# Patient Record
Sex: FEMALE | Race: WHITE | NOT HISPANIC OR LATINO | Employment: FULL TIME | URBAN - METROPOLITAN AREA
[De-identification: names, ages, dates, MRNs, and addresses within clinical notes are randomized per-mention and may not be internally consistent; named-entity substitution may affect disease eponyms.]

---

## 2023-07-24 LAB
C REACTIVE PROTEIN (MG/L) IN SER/PLAS: <0.1 MG/DL
RHEUMATOID FACTOR (IU/ML) IN SERUM OR PLASMA: <10 IU/ML (ref 0–15)
SEDIMENTATION RATE, ERYTHROCYTE: <1 MM/H (ref 0–20)

## 2023-07-25 LAB — ANTI-NUCLEAR ANTIBODY (ANA): NEGATIVE

## 2023-10-16 DIAGNOSIS — Z00.00 HEALTHCARE MAINTENANCE: Primary | ICD-10-CM

## 2023-10-16 RX ORDER — NORGESTIMATE AND ETHINYL ESTRADIOL 7DAYSX3 LO
1 KIT ORAL DAILY
Qty: 84 TABLET | Refills: 3 | Status: SHIPPED | OUTPATIENT
Start: 2023-10-16 | End: 2024-04-24 | Stop reason: WASHOUT

## 2024-01-05 ENCOUNTER — LAB (OUTPATIENT)
Dept: LAB | Facility: LAB | Age: 29
End: 2024-01-05
Payer: COMMERCIAL

## 2024-01-05 ENCOUNTER — OFFICE VISIT (OUTPATIENT)
Dept: PRIMARY CARE | Facility: CLINIC | Age: 29
End: 2024-01-05
Payer: COMMERCIAL

## 2024-01-05 VITALS
DIASTOLIC BLOOD PRESSURE: 77 MMHG | WEIGHT: 116 LBS | SYSTOLIC BLOOD PRESSURE: 127 MMHG | BODY MASS INDEX: 20.55 KG/M2 | OXYGEN SATURATION: 98 % | HEART RATE: 82 BPM | HEIGHT: 63 IN

## 2024-01-05 DIAGNOSIS — E55.9 VITAMIN D DEFICIENCY: ICD-10-CM

## 2024-01-05 DIAGNOSIS — Z00.00 ANNUAL PHYSICAL EXAM: ICD-10-CM

## 2024-01-05 DIAGNOSIS — Z00.00 ANNUAL PHYSICAL EXAM: Primary | ICD-10-CM

## 2024-01-05 LAB
25(OH)D3 SERPL-MCNC: 69 NG/ML (ref 30–100)
ALBUMIN SERPL BCP-MCNC: 4.6 G/DL (ref 3.4–5)
ALP SERPL-CCNC: 45 U/L (ref 33–110)
ALT SERPL W P-5'-P-CCNC: 25 U/L (ref 7–45)
ANION GAP SERPL CALC-SCNC: 13 MMOL/L (ref 10–20)
AST SERPL W P-5'-P-CCNC: 21 U/L (ref 9–39)
BILIRUB SERPL-MCNC: 0.7 MG/DL (ref 0–1.2)
BUN SERPL-MCNC: 13 MG/DL (ref 6–23)
CALCIUM SERPL-MCNC: 9.5 MG/DL (ref 8.6–10.6)
CHLORIDE SERPL-SCNC: 104 MMOL/L (ref 98–107)
CHOLEST SERPL-MCNC: 204 MG/DL (ref 0–199)
CHOLESTEROL/HDL RATIO: 2.8
CO2 SERPL-SCNC: 27 MMOL/L (ref 21–32)
CREAT SERPL-MCNC: 0.66 MG/DL (ref 0.5–1.05)
ERYTHROCYTE [DISTWIDTH] IN BLOOD BY AUTOMATED COUNT: 12 % (ref 11.5–14.5)
EST. AVERAGE GLUCOSE BLD GHB EST-MCNC: 91 MG/DL
GFR SERPL CREATININE-BSD FRML MDRD: >90 ML/MIN/1.73M*2
GLUCOSE SERPL-MCNC: 101 MG/DL (ref 74–99)
HBA1C MFR BLD: 4.8 %
HCT VFR BLD AUTO: 38.8 % (ref 36–46)
HDLC SERPL-MCNC: 73.2 MG/DL
HGB BLD-MCNC: 13.3 G/DL (ref 12–16)
LDLC SERPL CALC-MCNC: 121 MG/DL
MCH RBC QN AUTO: 31.1 PG (ref 26–34)
MCHC RBC AUTO-ENTMCNC: 34.3 G/DL (ref 32–36)
MCV RBC AUTO: 91 FL (ref 80–100)
NON HDL CHOLESTEROL: 131 MG/DL (ref 0–149)
NRBC BLD-RTO: 0 /100 WBCS (ref 0–0)
PLATELET # BLD AUTO: 239 X10*3/UL (ref 150–450)
POTASSIUM SERPL-SCNC: 4 MMOL/L (ref 3.5–5.3)
PROT SERPL-MCNC: 6.8 G/DL (ref 6.4–8.2)
RBC # BLD AUTO: 4.27 X10*6/UL (ref 4–5.2)
SODIUM SERPL-SCNC: 140 MMOL/L (ref 136–145)
TRIGL SERPL-MCNC: 47 MG/DL (ref 0–149)
TSH SERPL-ACNC: 0.78 MIU/L (ref 0.44–3.98)
VIT B12 SERPL-MCNC: 339 PG/ML (ref 211–911)
VLDL: 9 MG/DL (ref 0–40)
WBC # BLD AUTO: 5.7 X10*3/UL (ref 4.4–11.3)

## 2024-01-05 PROCEDURE — 80061 LIPID PANEL: CPT

## 2024-01-05 PROCEDURE — 80053 COMPREHEN METABOLIC PANEL: CPT

## 2024-01-05 PROCEDURE — 99395 PREV VISIT EST AGE 18-39: CPT | Performed by: FAMILY MEDICINE

## 2024-01-05 PROCEDURE — 36415 COLL VENOUS BLD VENIPUNCTURE: CPT

## 2024-01-05 PROCEDURE — 83036 HEMOGLOBIN GLYCOSYLATED A1C: CPT

## 2024-01-05 PROCEDURE — 1036F TOBACCO NON-USER: CPT | Performed by: FAMILY MEDICINE

## 2024-01-05 PROCEDURE — 82607 VITAMIN B-12: CPT

## 2024-01-05 PROCEDURE — 85027 COMPLETE CBC AUTOMATED: CPT

## 2024-01-05 PROCEDURE — 84443 ASSAY THYROID STIM HORMONE: CPT

## 2024-01-05 PROCEDURE — 82306 VITAMIN D 25 HYDROXY: CPT

## 2024-01-05 RX ORDER — LACTOBACILLUS COMBINATION NO.4 3B CELL
CAPSULE ORAL
COMMUNITY
End: 2024-04-24 | Stop reason: WASHOUT

## 2024-01-05 ASSESSMENT — PATIENT HEALTH QUESTIONNAIRE - PHQ9
SUM OF ALL RESPONSES TO PHQ9 QUESTIONS 1 AND 2: 0
1. LITTLE INTEREST OR PLEASURE IN DOING THINGS: NOT AT ALL
2. FEELING DOWN, DEPRESSED OR HOPELESS: NOT AT ALL

## 2024-01-05 ASSESSMENT — COLUMBIA-SUICIDE SEVERITY RATING SCALE - C-SSRS
2. HAVE YOU ACTUALLY HAD ANY THOUGHTS OF KILLING YOURSELF?: NO
1. IN THE PAST MONTH, HAVE YOU WISHED YOU WERE DEAD OR WISHED YOU COULD GO TO SLEEP AND NOT WAKE UP?: NO

## 2024-01-05 ASSESSMENT — ENCOUNTER SYMPTOMS
OCCASIONAL FEELINGS OF UNSTEADINESS: 0
DEPRESSION: 0

## 2024-01-05 NOTE — PROGRESS NOTES
"Subjective   Patient ID: Faustino Golden is a 28 y.o. female who presents for Annual Exam. I have reviewed her past social, surgical, family and medical history.    Last Annual Physical: Jan 2023   Last Dental Visit: Up-to-date   Last Eye exam: Jan 2024  Hearing Concerns: No  Diet: Well- balanced  Exercise Routine: Regular exercise      HPI   The patient reports that she is taking oral contraceptive pills as form of birth control. She has a family history of hypothyroidism and hypertension. She is interested in trying to conceive.    Review of Systems  Constitutional: No fever or chills, No Night Sweats  Eyes: No Blurry Vision or Eye sight problems  ENT: No Nasal Discharge, Hoarseness, sore throat  Cardiovascular: no chest pain, no palpitations and no syncope.   Respiratory: no cough, no shortness of breath during exertion and no shortness of breath at rest.   Gastrointestinal: no abdominal pain, no nausea and no vomiting.   : No vaginal discharge, burning with urination, no blood in urine or stools  Skin: No Skin rashes or Lesions  Neuro: No Headache, no dizziness or Numbness or tingling  Psych: No Anxiety, depression or sleeping problems  Heme: No Easy bleeding or brusing.     Objective   /77   Pulse 82   Ht 1.6 m (5' 3\")   Wt 52.6 kg (116 lb)   SpO2 98%   BMI 20.55 kg/m²     Physical Exam  Patient declined Chaperone  Constitutional: Alert and in no acute distress. Well developed, well nourished.   Head and Face: Head and face: Normal.    Eyes: Normal external exam. Pupils were equal in size, round, reactive to light (PERRL) with normal accommodation and extraocular movements intact (EOMI).   Ears, Nose, Mouth, and Throat: External inspection of ears and nose: Normal.  Hearing: Normal.  Nasal mucosa, septum, and turbinates: Normal.  Lips, teeth, and gums: Normal.  Oropharynx: Normal.   Neck: No neck mass was observed. Supple. Thyroid not enlarged and there were no palpable thyroid nodules. "   Cardiovascular: Heart rate and rhythm were normal, normal S1 and S2. Pedal pulses: Normal. No peripheral edema.   Pulmonary: No respiratory distress. Clear bilateral breath sounds.   Breast: Normal Appearance, No Masses or lumps palpated  Abdomen: Soft nontender; no abdominal mass palpated. Normal bowel sounds. No organomegaly.   Musculoskeletal: No joint swelling seen, normal movements of all extremities. Range of motion: Normal.  Muscle strength/tone: Normal.    Skin: Normal skin color and pigmentation, normal skin turgor, and no rash.   Neurologic: Deep tendon reflexes were 2+ and symmetric.   Psychiatric: Judgment and insight: Intact. Mood and affect: Normal.  Lymphatic: + bilateral cervical lymphadenopathy. Palpation of lymph nodes in axillae: Normal.  Palpation of lymph nodes in groin: Normal.    Lab Results   Component Value Date    WBC 4.4 01/12/2023    HGB 12.5 01/12/2023    HCT 37.9 01/12/2023     01/12/2023    CHOL 150 01/12/2023    TRIG 79 01/12/2023    HDL 65.1 01/12/2023    ALT 27 01/12/2023    AST 22 01/12/2023     01/12/2023    K 4.3 01/12/2023     01/12/2023    CREATININE 0.67 01/12/2023    BUN 12 01/12/2023    CO2 27 01/12/2023    TSH 1.72 01/12/2023    HGBA1C 4.9 01/12/2023       Electrocardiogram 12 Lead  Normal sinus rhythm  Normal ECG  No previous ECGs available  Confirmed by PAOLO GO MD ([id number]) on 7/29/2019 4:47:37 AM      Assessment/Plan   Diagnoses and all orders for this visit:  Annual physical exam  -     CBC; Future  -     Comprehensive Metabolic Panel; Future  -     Hemoglobin A1C; Future  -     Lipid Panel; Future  -     TSH with reflex to Free T4 if abnormal; Future  Vitamin D deficiency  -     Vitamin B12; Future  -     Vitamin D 25-Hydroxy,Total (for eval of Vitamin D levels); Future        Dear Faustino Golden     It was my pleasure to take care of you today in the office. Below are the things we discussed today:    1. 1. Immunizations: Yearly Flu shot is  recommended. Up-to-date          a: COVID: Please get booster from the pharmacy         b: Tetanus: Up-to-date         C. HPV: Up-to-date    2. Blood Work: Ordered  3. Seen your dentist twice a year  4. Yearly Eye exam is recommended    5. BMI: Normal   6: Diet recommendations:   Eat Clean, Try to have as many home cooked meals as possible  Avoid processed foods which contain excess calories, sugar, and sodium.    7. Exercise recommendations:   150 minutes a week to maintain your weight     If you have to loose weight, you need a better diet and exercise plan.     8. PAP - Up-to-date. Last done in Dec 2021. Cytology and HPV negative. The patient is scheduled to follow up with GYN in June 2024.    9. Please get your Living will / Advance directive completed if you do not have one already. Please make sure our office has a copy of the latest one.     10. Birth control: The patient is on OCPs.    11. Preconception counseling: Encouraged her to take a prenatal.    12. Cervical lymph node: Advised her to continue to monitor and if it increases in size  please let me know.    Follow up in one year for a Physical. Please call the office before your Physical to see if you need blood work completed prior to your physical.     Please call me if any questions arise from now until your next visit. I will call you after I am done seeing patients. A Doctor is always available by phone when the office is closed. Please feel free to call for help with any problem that you feel shouldn't wait until the office re-opens.     Scribe Attestation  By signing my name below, IGiselle Scribe   attest that this documentation has been prepared under the direction and in the presence of Shayy Samano MD.

## 2024-04-24 ENCOUNTER — INITIAL PRENATAL (OUTPATIENT)
Dept: OBSTETRICS AND GYNECOLOGY | Facility: CLINIC | Age: 29
End: 2024-04-24
Payer: COMMERCIAL

## 2024-04-24 VITALS — WEIGHT: 110.6 LBS | SYSTOLIC BLOOD PRESSURE: 118 MMHG | DIASTOLIC BLOOD PRESSURE: 70 MMHG | BODY MASS INDEX: 19.59 KG/M2

## 2024-04-24 DIAGNOSIS — Z34.91 PRENATAL CARE IN FIRST TRIMESTER (HHS-HCC): ICD-10-CM

## 2024-04-24 PROBLEM — Z00.00 ANNUAL PHYSICAL EXAM: Status: RESOLVED | Noted: 2024-01-05 | Resolved: 2024-04-24

## 2024-04-24 PROBLEM — Z34.01 ENCOUNTER FOR SUPERVISION OF NORMAL FIRST PREGNANCY IN FIRST TRIMESTER (HHS-HCC): Status: ACTIVE | Noted: 2024-04-24

## 2024-04-24 LAB — PREGNANCY TEST URINE, POC: POSITIVE

## 2024-04-24 PROCEDURE — 87800 DETECT AGNT MULT DNA DIREC: CPT

## 2024-04-24 PROCEDURE — 81025 URINE PREGNANCY TEST: CPT

## 2024-04-24 PROCEDURE — 0500F INITIAL PRENATAL CARE VISIT: CPT

## 2024-04-24 PROCEDURE — 87086 URINE CULTURE/COLONY COUNT: CPT

## 2024-04-24 ASSESSMENT — ENCOUNTER SYMPTOMS
ENDOCRINE NEGATIVE: 0
ALLERGIC/IMMUNOLOGIC NEGATIVE: 0
HEMATOLOGIC/LYMPHATIC NEGATIVE: 0
CARDIOVASCULAR NEGATIVE: 0
CONSTITUTIONAL NEGATIVE: 0
NEUROLOGICAL NEGATIVE: 0
PSYCHIATRIC NEGATIVE: 0
RESPIRATORY NEGATIVE: 0
EYES NEGATIVE: 0
MUSCULOSKELETAL NEGATIVE: 0
GASTROINTESTINAL NEGATIVE: 0

## 2024-04-24 ASSESSMENT — EDINBURGH POSTNATAL DEPRESSION SCALE (EPDS)
I HAVE FELT SAD OR MISERABLE: NO, NOT AT ALL
I HAVE LOOKED FORWARD WITH ENJOYMENT TO THINGS: AS MUCH AS I EVER DID
THINGS HAVE BEEN GETTING ON TOP OF ME: NO, MOST OF THE TIME I HAVE COPED QUITE WELL
I HAVE BEEN SO UNHAPPY THAT I HAVE BEEN CRYING: NO, NEVER
TOTAL SCORE: 4
I HAVE BEEN ABLE TO LAUGH AND SEE THE FUNNY SIDE OF THINGS: AS MUCH AS I ALWAYS COULD
I HAVE BEEN ANXIOUS OR WORRIED FOR NO GOOD REASON: HARDLY EVER
I HAVE BEEN SO UNHAPPY THAT I HAVE HAD DIFFICULTY SLEEPING: NOT AT ALL
THE THOUGHT OF HARMING MYSELF HAS OCCURRED TO ME: NEVER
I HAVE FELT SCARED OR PANICKY FOR NO GOOD REASON: NO, NOT MUCH
I HAVE BLAMED MYSELF UNNECESSARILY WHEN THINGS WENT WRONG: NOT VERY OFTEN

## 2024-04-24 NOTE — PROGRESS NOTES
Faustino Golden is a 28 y.o.  at 7w5d with a working estimated date of delivery of 2024, by Last Menstrual Period who presents for an initial prenatal visit. This pregnancy is unplanned.    Stopped birth control pills two months ago, though was not trying to conceive, wanted to regulate her periods.     Patient's last menstrual period was 2024.  US ordered; patient may defer until 11-13 weeks.      Patient currently experiencing:  Breast tenderness.    Bleeding or cramping since LMP: no  Taking prenatal vitamin: Yes  Ultrasound completed this pregnancy: No      OB History    Para Term  AB Living   1 0 0 0 0 0   SAB IAB Ectopic Multiple Live Births   0 0 0 0 0      # Outcome Date GA Lbr Rai/2nd Weight Sex Delivery Anes PTL Lv   1 Current              [place]  Depression Scale Total: 4    History of hypertension:  No  Preeclampsia Risk - ASA prophylaxis; pt does not meet criteria.    Past Medical History:   Diagnosis Date    Acute maxillary sinusitis, unspecified 2019    Acute non-recurrent maxillary sinusitis    Acute vaginitis 2019    Bacterial vaginosis    Headache, unspecified 2020    New onset of headaches    Migraine with aura, not intractable, without status migrainosus 2020    Ophthalmic migraine    Migraine, unspecified, not intractable, without status migrainosus 2020    Migraine headache    Personal history of other diseases of the female genital tract     History of abnormal menstrual cycle    Viral infection, unspecified 2020    Acute viral syndrome      Last pap: 2021, normal.   Hx Depression/Anxiety - No    Past Surgical History:   Procedure Laterality Date    OTHER SURGICAL HISTORY  2020    Oral Surgery        Social History     Tobacco Use    Smoking status: Never    Smokeless tobacco: Never   Vaping Use    Vaping status: Never Used   Substance Use Topics    Alcohol use: Not Currently    Drug use: Never       Routine PNC, patient appropriate for midwifery service. Oriented to practice, including available collaboration and consulting with physicians.   Discussed routine OB labs, including serum STI/HIV, CBC, blood type and screen.    Pregravid BMI: 19.49  Expected Total Weight Gain: 11.5 kg (25 lb)-16 kg (35 lb)     Education provided r/t nutrition, folic acid supplementation, dietary guidelines, exercise, smoking, alcohol, caffeine, and drug use.    Covid vaccinated x 2.  Strong recommendation and support for Covid vaccine discussed. Patient aware of increased rate of hospitalization, intubation, and death with Covid in pregnancy - Demonstrated safety of vaccination during pregnancy with no associated increases in GDM, hypertensive disorders, miscarriage/ labor nor fetal anomalies reviewed. Patient aware vaccination is recommended by ACNM, ACOG, SMFM, and CDC - She declines current vaccination.    Flu vaccinated.     Current Outpatient Medications:     conception assist.supplies no1 (CONCEPTION ASSIST SUPP CMB  1 MISC), , Disp: , Rfl:     PRENATAL 2-IRON-FOLIC ACID-OM3 ORAL, Take by mouth., Disp: , Rfl:      Genetic Testing - The patient was counselled regarding prenatal genetic testing options and was provided literature in the obstetric folder. First line screening options, including cfDNA and first trimester ultrasound for nuchal translucency, were discussed and offered.  Benefits, drawbacks, and limitations were explained respectively.  It was clearly stated that only diagnostic testing (amniocentesis) provides definitive information regarding a genetic diagnosis in the fetus. It was discussed with the patient that the false positive rates for NIPT is higher for women under 35 years of age. The patient was informed that the cost of NIPT ranges and may not be covered by insurance depending on patient's age and risk factors. Patient aware that gender testing is available at a fraction of the cost through  [employer].  This patient has decided to pursue.     Lives in [place], [place], considering transferring care to [facility] if they move.     Warning s/s discussed and SAB precautions reviewed.  F/U in 4 weeks -- Review U/S, needs new OB labs/NIPT, PE and pap.    Hazel Szymanski, APRN-CNM

## 2024-04-25 LAB
C TRACH RRNA SPEC QL NAA+PROBE: NEGATIVE
N GONORRHOEA DNA SPEC QL PROBE+SIG AMP: NEGATIVE

## 2024-04-26 LAB — BACTERIA UR CULT: NORMAL

## 2024-05-22 ENCOUNTER — LAB (OUTPATIENT)
Dept: LAB | Facility: LAB | Age: 29
End: 2024-05-22
Payer: COMMERCIAL

## 2024-05-22 ENCOUNTER — ROUTINE PRENATAL (OUTPATIENT)
Dept: OBSTETRICS AND GYNECOLOGY | Facility: CLINIC | Age: 29
End: 2024-05-22
Payer: COMMERCIAL

## 2024-05-22 VITALS
WEIGHT: 113.25 LBS | SYSTOLIC BLOOD PRESSURE: 128 MMHG | BODY MASS INDEX: 20.06 KG/M2 | DIASTOLIC BLOOD PRESSURE: 70 MMHG

## 2024-05-22 DIAGNOSIS — Z3A.11 11 WEEKS GESTATION OF PREGNANCY (HHS-HCC): Primary | ICD-10-CM

## 2024-05-22 DIAGNOSIS — Z34.91 PRENATAL CARE IN FIRST TRIMESTER (HHS-HCC): ICD-10-CM

## 2024-05-22 LAB
ABO GROUP (TYPE) IN BLOOD: NORMAL
ANTIBODY SCREEN: NORMAL
ERYTHROCYTE [DISTWIDTH] IN BLOOD BY AUTOMATED COUNT: 12.2 % (ref 11.5–14.5)
HBV SURFACE AG SERPL QL IA: NONREACTIVE
HCT VFR BLD AUTO: 34 % (ref 36–46)
HCV AB SER QL: NONREACTIVE
HGB BLD-MCNC: 11.7 G/DL (ref 12–16)
HIV 1+2 AB+HIV1 P24 AG SERPL QL IA: NONREACTIVE
MCH RBC QN AUTO: 31.6 PG (ref 26–34)
MCHC RBC AUTO-ENTMCNC: 34.4 G/DL (ref 32–36)
MCV RBC AUTO: 92 FL (ref 80–100)
NRBC BLD-RTO: 0 /100 WBCS (ref 0–0)
PLATELET # BLD AUTO: 241 X10*3/UL (ref 150–450)
RBC # BLD AUTO: 3.7 X10*6/UL (ref 4–5.2)
REFLEX ADDED, ANEMIA PANEL: NORMAL
RH FACTOR (ANTIGEN D): NORMAL
WBC # BLD AUTO: 7.5 X10*3/UL (ref 4.4–11.3)

## 2024-05-22 PROCEDURE — 36415 COLL VENOUS BLD VENIPUNCTURE: CPT

## 2024-05-22 PROCEDURE — 86901 BLOOD TYPING SEROLOGIC RH(D): CPT

## 2024-05-22 PROCEDURE — 87389 HIV-1 AG W/HIV-1&-2 AB AG IA: CPT

## 2024-05-22 PROCEDURE — 86317 IMMUNOASSAY INFECTIOUS AGENT: CPT

## 2024-05-22 PROCEDURE — 86780 TREPONEMA PALLIDUM: CPT

## 2024-05-22 PROCEDURE — 86900 BLOOD TYPING SEROLOGIC ABO: CPT

## 2024-05-22 PROCEDURE — 0501F PRENATAL FLOW SHEET: CPT

## 2024-05-22 PROCEDURE — 85027 COMPLETE CBC AUTOMATED: CPT

## 2024-05-22 PROCEDURE — 86850 RBC ANTIBODY SCREEN: CPT

## 2024-05-22 PROCEDURE — 86803 HEPATITIS C AB TEST: CPT

## 2024-05-22 PROCEDURE — 88175 CYTOPATH C/V AUTO FLUID REDO: CPT

## 2024-05-22 PROCEDURE — 87340 HEPATITIS B SURFACE AG IA: CPT

## 2024-05-22 NOTE — PROGRESS NOTES
"Subjective   Faustino Golden \"Aishwarya\" is a 28 y.o.  at 11w5d with a working estimated date of delivery of 2024, by Last Menstrual Period who presents for a routine prenatal visit.    Patient reports overall feeling well; no concerns or OB complaints.  Nausea has improved.  Recently returned from trip to [place].    Objective   Visit Vitals  /70   Wt 51.4 kg (113 lb 4 oz)   LMP 2024   BMI 20.06 kg/m²   OB Status Pregnant   Smoking Status Never   BSA 1.51 m²     Vitals:    24 1600   Weight: 51.4 kg (113 lb 4 oz)      Pregravid Weight/BMI - 49.9 kg (110 lb) / 19.49  Expected Total Weight Gain - 11.5 kg (25 lb)-16 kg (35 lb)  TW.474 kg (3 lb 4 oz)  Fundal height and FHR per prenatal flowsheet.    Assessment/Plan   OB labs, NIPT, pap and PE today.  Will schedule for 13 week US.  When NIPT results, CALL PATIENT with results rather than MyChart message, does not want to know sex; planning a gender reveal.  Warning signs and symptoms reviewed; patient has emergency answering service phone line number and is aware of when to call.   Remainder of plan per problem list as below.     Medical Problems       Problem List       Encounter for supervision of normal first pregnancy in first trimester ([facility])    Overview Signed 2024  8:33 AM by DEEP Lund     Covid vaccinated x 2; declines current vaccination.  Flu vaccinated.              F/U in 4 weeks.    DEEP Lund    "

## 2024-05-23 PROBLEM — Z67.91 RH NEGATIVE STATE IN ANTEPARTUM PERIOD (HHS-HCC): Status: ACTIVE | Noted: 2024-05-23

## 2024-05-23 PROBLEM — O26.899 RH NEGATIVE STATE IN ANTEPARTUM PERIOD (HHS-HCC): Status: ACTIVE | Noted: 2024-05-23

## 2024-05-23 LAB
RUBV IGG SERPL IA-ACNC: 3.8 IA
RUBV IGG SERPL QL IA: POSITIVE
TREPONEMA PALLIDUM IGG+IGM AB [PRESENCE] IN SERUM OR PLASMA BY IMMUNOASSAY: NONREACTIVE

## 2024-05-29 LAB — SCAN RESULT: NORMAL

## 2024-05-30 LAB
CYTOLOGY CMNT CVX/VAG CYTO-IMP: NORMAL
LAB AP HPV GENOTYPE QUESTION: YES
LAB AP HPV HR: NORMAL
LABORATORY COMMENT REPORT: NORMAL
MENSTRUAL HX REPORTED: NORMAL
PATH REPORT.TOTAL CANCER: NORMAL

## 2024-06-03 ENCOUNTER — HOSPITAL ENCOUNTER (OUTPATIENT)
Dept: RADIOLOGY | Facility: CLINIC | Age: 29
Discharge: HOME | End: 2024-06-03
Payer: COMMERCIAL

## 2024-06-03 DIAGNOSIS — Z34.91 PRENATAL CARE IN FIRST TRIMESTER (HHS-HCC): ICD-10-CM

## 2024-06-03 PROCEDURE — 76813 OB US NUCHAL MEAS 1 GEST: CPT

## 2024-06-03 PROCEDURE — 76813 OB US NUCHAL MEAS 1 GEST: CPT | Performed by: STUDENT IN AN ORGANIZED HEALTH CARE EDUCATION/TRAINING PROGRAM

## 2024-06-20 ENCOUNTER — APPOINTMENT (OUTPATIENT)
Dept: OBSTETRICS AND GYNECOLOGY | Facility: CLINIC | Age: 29
End: 2024-06-20
Payer: COMMERCIAL

## 2024-06-26 ENCOUNTER — APPOINTMENT (OUTPATIENT)
Dept: OBSTETRICS AND GYNECOLOGY | Facility: CLINIC | Age: 29
End: 2024-06-26
Payer: COMMERCIAL

## 2024-06-26 VITALS — WEIGHT: 113 LBS | DIASTOLIC BLOOD PRESSURE: 70 MMHG | SYSTOLIC BLOOD PRESSURE: 114 MMHG | BODY MASS INDEX: 20.02 KG/M2

## 2024-06-26 DIAGNOSIS — Z3A.15 15 WEEKS GESTATION OF PREGNANCY (HHS-HCC): Primary | ICD-10-CM

## 2024-06-26 PROCEDURE — 0501F PRENATAL FLOW SHEET: CPT

## 2024-06-26 NOTE — PROGRESS NOTES
"Subjective   Faustino Golden \"Aishwarya\" is a 28 y.o.  at 16w5d with a working estimated date of delivery of 2024, by Last Menstrual Period who presents for a routine prenatal visit.    Patient reports overall feeling well; no concerns or OB complaints.    Objective   Visit Vitals  /70   Wt 51.3 kg (113 lb)   LMP 2024   BMI 20.02 kg/m²   OB Status Pregnant   Smoking Status Never   BSA 1.51 m²     Vitals:    24 1300   Weight: 51.3 kg (113 lb)      Pregravid Weight/BMI - 49.9 kg (110 lb) / 19.49  Expected Total Weight Gain - 11.5 kg (25 lb)-16 kg (35 lb)  TW.361 kg (3 lb)  Fundal height and FHR per prenatal flowsheet.    Assessment/Plan   Reviewed new OB labs and normal NIPT results.   A negative blood type -- Discussed indication for Rhogam at 28 weeks.  Reviewed 13 week US and newly assigned FLORENTIN.   Anatomy US is scheduled.   [place] [facility]; transferring remaining visits after next visit with provider at [facility].  Discussed timing of when to expect to begin to feel fetal movement.  Warning signs and symptoms reviewed; patient has emergency answering service phone line number and is aware of when to call.   Remainder of plan per problem list as below.     Medical Problems       Problem List       Encounter for supervision of normal first pregnancy in first trimester ([facility])    Overview Addendum 2024  8:13 AM by DEEP Lund     Covid vaccinated x 2; declines current vaccination.  Flu vaccinated.   Normal rr NIPS -- It's a BOY!          Rh negative state in antepartum period ([facility])    Overview Signed 2024  9:18 AM by DEEP Lund     For Rhogam at 28 weeks.             F/U in 4 weeks; review anatomy US.    DEEP Lund    "

## 2024-07-22 ENCOUNTER — HOSPITAL ENCOUNTER (OUTPATIENT)
Dept: RADIOLOGY | Facility: CLINIC | Age: 29
Discharge: HOME | End: 2024-07-22
Payer: COMMERCIAL

## 2024-07-22 DIAGNOSIS — Z34.91 PRENATAL CARE IN FIRST TRIMESTER (HHS-HCC): ICD-10-CM

## 2024-07-22 PROCEDURE — 76811 OB US DETAILED SNGL FETUS: CPT | Performed by: MEDICAL GENETICS

## 2024-07-22 PROCEDURE — 76811 OB US DETAILED SNGL FETUS: CPT

## 2024-07-24 ENCOUNTER — ROUTINE PRENATAL (OUTPATIENT)
Dept: OBSTETRICS AND GYNECOLOGY | Facility: CLINIC | Age: 29
End: 2024-07-24
Payer: COMMERCIAL

## 2024-07-24 ENCOUNTER — APPOINTMENT (OUTPATIENT)
Dept: OBSTETRICS AND GYNECOLOGY | Facility: CLINIC | Age: 29
End: 2024-07-24
Payer: COMMERCIAL

## 2024-07-24 VITALS — WEIGHT: 115 LBS | BODY MASS INDEX: 20.37 KG/M2 | SYSTOLIC BLOOD PRESSURE: 112 MMHG | DIASTOLIC BLOOD PRESSURE: 64 MMHG

## 2024-07-24 DIAGNOSIS — O26.899 RH NEGATIVE STATE IN ANTEPARTUM PERIOD (HHS-HCC): ICD-10-CM

## 2024-07-24 DIAGNOSIS — Z34.02 ENCOUNTER FOR SUPERVISION OF NORMAL FIRST PREGNANCY IN SECOND TRIMESTER (HHS-HCC): ICD-10-CM

## 2024-07-24 DIAGNOSIS — Z3A.19 19 WEEKS GESTATION OF PREGNANCY (HHS-HCC): Primary | ICD-10-CM

## 2024-07-24 DIAGNOSIS — Z67.91 RH NEGATIVE STATE IN ANTEPARTUM PERIOD (HHS-HCC): ICD-10-CM

## 2024-07-24 PROCEDURE — 0501F PRENATAL FLOW SHEET: CPT | Performed by: ADVANCED PRACTICE MIDWIFE

## 2024-07-24 NOTE — PROGRESS NOTES
"Return OB visit    S: Faustino Golden \"Aishwarya\" is a 29 y.o.  at 19w2d with a working estimated date of delivery of 2024, by Ultrasound who presents for a routine prenatal visit. She denies vaginal bleeding, abdominal pain, leakage of fluid.     Concerns today: Patient has no questions or concerns today.    YANIRA EVANS MA      O: See prenatal flow sheet    A/P:  Anatomy sono WNL  Reviewed warning signs and when to call midwife  Follow up in 4 weeks for a routine prenatal visit  "

## 2024-08-19 ENCOUNTER — ROUTINE PRENATAL (OUTPATIENT)
Dept: OBSTETRICS AND GYNECOLOGY | Facility: CLINIC | Age: 29
End: 2024-08-19
Payer: COMMERCIAL

## 2024-08-19 VITALS — WEIGHT: 121 LBS | BODY MASS INDEX: 21.43 KG/M2 | SYSTOLIC BLOOD PRESSURE: 127 MMHG | DIASTOLIC BLOOD PRESSURE: 83 MMHG

## 2024-08-19 DIAGNOSIS — Z34.02 ENCOUNTER FOR SUPERVISION OF NORMAL FIRST PREGNANCY IN SECOND TRIMESTER (HHS-HCC): Primary | ICD-10-CM

## 2024-08-19 DIAGNOSIS — Z67.91 RH NEGATIVE STATE IN ANTEPARTUM PERIOD (HHS-HCC): ICD-10-CM

## 2024-08-19 DIAGNOSIS — O26.899 RH NEGATIVE STATE IN ANTEPARTUM PERIOD (HHS-HCC): ICD-10-CM

## 2024-08-19 PROCEDURE — 0501F PRENATAL FLOW SHEET: CPT | Performed by: OBSTETRICS & GYNECOLOGY

## 2024-08-19 NOTE — PATIENT INSTRUCTIONS
DR. ESQUIVEL'S PREGNANCY SUPPORT    [facility] CHILDBIRTH & PARENTING EDUCATION (Virtual & By Appointment Services)  http://www.[facility].org/selvin/health-and-wellness/pregnancy-resources/childbirth-and-parenting-education-programs  [phone]  Recommended programs include the Prepared Childbirth series, the Spinning Babies course, the Infant Care series, Boot Camp for New Dads, Car Seat Safety assessment, Friends & Family CPR, and Prenatal Tours. If planning to labor without an epidural, recommend the Hypnobirthing and Spinning Babies course.    [facility] INTEGRATIVE HEALTH NETWORK  https://www.[facility].org/services/integrative-health-network/meet-the-team  [phone]  Network of acupuncturists, massage therapists, chiropractors, and integrative medicine specialists who assist with a variety of pregnancy-related concerns including but not limited to muscle or joint pain, breech presentation, and chronic pain conditions.    [facility] WOMEN'S MENTAL HEALTH CLINIC  https://www.[facility].org/services/psychiatry/conditions-treatments/womens-mental-health   [phone]  Specially trained team of mental health professionals who are experts in the treatment of anxiety, depression, bipolar illness, emotional trauma, and other mental conditions affecting pregnancy.    LOOKING FOR PROFESSIONAL BIRTH SUPPORT?  [employer] ( Certifying Organization)  https://www.ayo.org/  Type in your zip code to find a certified birth and postpartum  near you    [facility]  http://www.[facility].[email or web address]/    CALM  SERVICES  https://calmlabShookce.[employer]/    SOFIA MA'AM  https://www.Plug.djam.[employer]/    CLEBABY  https://www.clebaby.com    THE WOMB WELLNESS CENTER  http://www.Tungle.me.[employer]/    NURTURED FOUNDATION   https://nurturedfWeeks Communications.com/    [facility] [place]  https://www.BrightView Systemsllagecle.org     FRUIT OF THE WOMB   SERVICES  https://www.fruitofthewombirth.com     HELPFUL ONLINE RESOURCES   Evidence Based Birth ® website    Spinning Babies® website: “Flip a Breech,” “Engaging Baby in Labor,” “Three Levels of the Pelvis”    Ariadna Car (DAMIAN-certified  and birth educator) videos on Youtube     Pregnancy and Postpartum TV videos (YouTube) - especially check out videos on diastasis recti, pelvic floor exercises, and pregnancy yoga for sciatica and low back pain     “How to Turn a Breech, Posterior or Transverse baby” - Fit Mums Channel (YouTube)    “How to do External Cephalic Version - Professional Version” - Merck Manuals (YouTube)    “False Labor vs.  True Labor” - [facility] (YouTube)    “The 8 Best Labor Positions for the First Phases of Childbirth” - The Bump (Youtube)    “ Section” - E la Carte (YouTube)     [employer] breastfeeding videos (YouTube) - especially check out “Hand Expression” and “Breast Massage for Engorgement”     “How to Put on a Haaka Silicone Breastpump” - [employer] Bl[employer] Life by Lamar (Youtube)    DR. ESQUIVEL'S GUIDE TO WRITING A BIRTH PLAN    A birth plan is a written statement of how a parent would like her labor and delivery to occur. Not every parent cares to make one, but a lot of parents do find the process of writing a birth plan useful.    I used to give patients a check-box style template to fill out, but I've found over time that such a document does not properly reflect the underlying goals of the birth plan, and can sometimes create unnecessary conflict between the parents and staff. If you are interested in a more templated type birth plan, I suggest exploring the one on the [place] [facility] website.     These days, I suggest you write a personalized birth plan. It should be relatively short (no more than 1-2 pages), polite, and help the care team understand you and your goals in a holistic way, highlighting any requests  that are very important to you. Use positive rather than negative statements (e.g., “I am eager to see what my body can accomplish naturally” rather than “I do not want Pitocin.”).    Some things to think about as you write your birth plan:  What would your ideal birth look like? How might you bring elements of that into your hospital birth? You don't have to necessarily put environmental “wants” into the birth plan document, but these are good things to discuss with your provider ahead of time, so you know what's allowed and what might be modified to meet your wishes.  What role will you play in achieving the birth you want? How did you prepare? How can the team support your work?   Some births will need medical intervention for the safety of the mom or baby. When such an intervention needs to occur, how would you like that interaction to look? What would make you feel safe and empowered when hard choices must be made?  Do you have past experiences, good or bad, that influence your vision for this birth? If so, consider sharing some aspect of that with your care providers. Be specific about the details that will make a huge difference to you, especially if you might experience resentment or regret if those details are missed.  Who will speak for you if you're too tired, too sick, or too engrossed in labor? Let staff know ahead of time, so they don't assume someone is substituting their preferences for yours.    Some things that parents commonly put in their birth plans are standard at [facility]. We allow a long labor before declaring it failed, we provide intermittent auscultation for medically qualified pregnancies, our episiotomy rate is <1%, we delay cord clamping for at least 30 seconds, and encourage skin-to-skin for healthy newborns. Your prenatal visits are a good time to learn more about the practice patterns at this particular institution.    I encourage you to complete your birth plan at least  2-3 weeks before your expected delivery date, so we can discuss it before you arrive in the hospital.     We look forward to working with you in achieving a safe and memorable birth.    [facility] GUIDELINES FOR FETAL MONITORING    During your birth, it is important to ensure you and your baby are safe.   For this reason, some degree of fetal monitoring is always performed.     Some women may qualify for intermittent monitoring instead of continuous monitoring. This allows the woman to move around more during her labor.     To qualify for this, the woman must have a normal fetal monitoring result when she is first admitted to the hospital, AND be absent any high risk criteria (see below):   The use of labor-inducing medications (oxytocin, misoprostol, or Cervidil®)   Epidural analgesia   Fentanyl patient controlled analgesia   Meconium stained amniotic fluid   Previous  delivery   Diabetes, other than gestational diet-controlled   Hypertensive disorder   Intrauterine growth restriction   Multiple gestation   Low fluid   Gestation < 37.0 weeks   History of previous stillbirth   Unexplained vaginal bleeding   Maternal temp > 37.9 °C   Other known indication for continuous electronic fetal monitoring     If you are interested in intermittent monitoring, please let your provider know so we can discuss it in more detail.    [facility] GUIDELINES FOR NITROUS OXIDE  Nitrous oxide is a patient-administered pre-epidural pain relief option at [facility].  If you have any of the following conditions, unfortunately you will NOT be able to utilize nitrous oxide:  Inability to hold mask to face independent  Received IV narcotics within the last 2 hours  Impaired or intoxicated  Impaired oxygenation: cystic fibrosis or chronic lung disease, baseline O2 saturation <96%, history of sleep apnea recommended for CPAP  Active COVID+   B12 deficiency  Conditions that include air in a closed body  space: bowel obstruction, acute ear infection, acute pneumothorax, recent craniotomy, increased intracranial pressure, penetrating eye injury, retinal surgery  Hemodynamically unstable  Magnesium Sulfate administration  <35 weeks gestation  Epidural administration

## 2024-08-19 NOTE — PROGRESS NOTES
"SUBJECTIVE  HPI: Faustino Golden \"Aishwarya\" is a 29 y.o.  at 23w0d here for RPNV. [place] from [place] as she moved to [place].   Denies contractions, bleeding, or LOF. Reports normal fetal movement.   Discussed LWG, pt will monitor calories and eat more if needed to hit 1800kcal/day for the baby (so if she exercises, need to eat more). Rhogam and Tdap next visit.  Discussed prenatal classes.    Problem List Items Addressed This Visit          Active Problems    Encounter for supervision of normal first pregnancy in first trimester ([facility])    Overview     Covid vaccinated x 2; declines current vaccination.  Flu vaccinated.   Normal rr NIPS -- It's a BOY!          Relevant Orders    CBC Anemia Panel With Reflex,Pregnancy    Glucose, 1 Hour Screen, Pregnancy    Syphilis Screen with Reflex    Vitamin D 25-Hydroxy,Total (for eval of Vitamin D levels)    Type And Screen    Rh negative state in antepartum period ([facility]) - Primary    Overview     A neg neg  For Rhogam at 28 weeks.         Relevant Orders    Type And Screen       Orders Placed This Encounter   Procedures    CBC Anemia Panel With Reflex,Pregnancy    Glucose, 1 Hour Screen, Pregnancy    Syphilis Screen with Reflex    Vitamin D 25-Hydroxy,Total (for eval of Vitamin D levels)    Type And Screen     RTC in 4 weeks    Odilia Martinez MD   "

## 2024-08-21 ENCOUNTER — APPOINTMENT (OUTPATIENT)
Dept: OBSTETRICS AND GYNECOLOGY | Facility: CLINIC | Age: 29
End: 2024-08-21
Payer: COMMERCIAL

## 2024-09-16 ENCOUNTER — APPOINTMENT (OUTPATIENT)
Dept: OBSTETRICS AND GYNECOLOGY | Facility: CLINIC | Age: 29
End: 2024-09-16
Payer: COMMERCIAL

## 2024-09-18 ENCOUNTER — APPOINTMENT (OUTPATIENT)
Dept: OBSTETRICS AND GYNECOLOGY | Facility: CLINIC | Age: 29
End: 2024-09-18
Payer: COMMERCIAL

## 2024-09-20 ENCOUNTER — LAB (OUTPATIENT)
Dept: LAB | Facility: LAB | Age: 29
End: 2024-09-20
Payer: COMMERCIAL

## 2024-09-20 DIAGNOSIS — Z67.91 RH NEGATIVE STATE IN ANTEPARTUM PERIOD (HHS-HCC): ICD-10-CM

## 2024-09-20 DIAGNOSIS — O26.899 RH NEGATIVE STATE IN ANTEPARTUM PERIOD (HHS-HCC): ICD-10-CM

## 2024-09-20 DIAGNOSIS — Z34.02 ENCOUNTER FOR SUPERVISION OF NORMAL FIRST PREGNANCY IN SECOND TRIMESTER: ICD-10-CM

## 2024-09-20 LAB
25(OH)D3 SERPL-MCNC: 86 NG/ML (ref 30–100)
ABO GROUP (TYPE) IN BLOOD: NORMAL
ANTIBODY SCREEN: NORMAL
ERYTHROCYTE [DISTWIDTH] IN BLOOD BY AUTOMATED COUNT: 12.7 % (ref 11.5–14.5)
FERRITIN SERPL-MCNC: 33 NG/ML
FOLATE SERPL-MCNC: 19.6 NG/ML
GLUCOSE 1H P 50 G GLC PO SERPL-MCNC: 87 MG/DL
HCT VFR BLD AUTO: 32.7 % (ref 36–46)
HGB BLD-MCNC: 10.9 G/DL (ref 12–16)
IRON SATN MFR SERPL: 29 %
IRON SERPL-MCNC: 134 UG/DL
MCH RBC QN AUTO: 32.5 PG (ref 26–34)
MCHC RBC AUTO-ENTMCNC: 33.3 G/DL (ref 32–36)
MCV RBC AUTO: 98 FL (ref 80–100)
NRBC BLD-RTO: 0 /100 WBCS (ref 0–0)
PLATELET # BLD AUTO: 211 X10*3/UL (ref 150–450)
RBC # BLD AUTO: 3.35 X10*6/UL (ref 4–5.2)
REFLEX ADDED, ANEMIA PANEL: NORMAL
RH FACTOR (ANTIGEN D): NORMAL
TIBC SERPL-MCNC: 456 UG/DL
TREPONEMA PALLIDUM IGG+IGM AB [PRESENCE] IN SERUM OR PLASMA BY IMMUNOASSAY: NONREACTIVE
UIBC SERPL-MCNC: 322 UG/DL
VIT B12 SERPL-MCNC: 240 PG/ML
WBC # BLD AUTO: 7.4 X10*3/UL (ref 4.4–11.3)

## 2024-09-20 PROCEDURE — 82607 VITAMIN B-12: CPT

## 2024-09-20 PROCEDURE — 82306 VITAMIN D 25 HYDROXY: CPT

## 2024-09-20 PROCEDURE — 86901 BLOOD TYPING SEROLOGIC RH(D): CPT

## 2024-09-20 PROCEDURE — 82728 ASSAY OF FERRITIN: CPT

## 2024-09-20 PROCEDURE — 83550 IRON BINDING TEST: CPT

## 2024-09-20 PROCEDURE — 36415 COLL VENOUS BLD VENIPUNCTURE: CPT

## 2024-09-20 PROCEDURE — 82746 ASSAY OF FOLIC ACID SERUM: CPT

## 2024-09-20 PROCEDURE — 86850 RBC ANTIBODY SCREEN: CPT

## 2024-09-20 PROCEDURE — 85027 COMPLETE CBC AUTOMATED: CPT

## 2024-09-20 PROCEDURE — 86780 TREPONEMA PALLIDUM: CPT

## 2024-09-20 PROCEDURE — 82947 ASSAY GLUCOSE BLOOD QUANT: CPT

## 2024-09-20 PROCEDURE — 86900 BLOOD TYPING SEROLOGIC ABO: CPT

## 2024-09-25 ENCOUNTER — ROUTINE PRENATAL (OUTPATIENT)
Dept: OBSTETRICS AND GYNECOLOGY | Facility: CLINIC | Age: 29
End: 2024-09-25
Payer: COMMERCIAL

## 2024-09-25 VITALS — SYSTOLIC BLOOD PRESSURE: 120 MMHG | BODY MASS INDEX: 21.97 KG/M2 | DIASTOLIC BLOOD PRESSURE: 77 MMHG | WEIGHT: 124 LBS

## 2024-09-25 DIAGNOSIS — O26.899 RH NEGATIVE STATE IN ANTEPARTUM PERIOD (HHS-HCC): Primary | ICD-10-CM

## 2024-09-25 DIAGNOSIS — Z3A.28 28 WEEKS GESTATION OF PREGNANCY (HHS-HCC): ICD-10-CM

## 2024-09-25 DIAGNOSIS — Z67.91 RH NEGATIVE STATE IN ANTEPARTUM PERIOD (HHS-HCC): Primary | ICD-10-CM

## 2024-09-25 DIAGNOSIS — Z23 NEED FOR TDAP VACCINATION: ICD-10-CM

## 2024-09-25 DIAGNOSIS — Z34.03 ENCOUNTER FOR SUPERVISION OF NORMAL FIRST PREGNANCY IN THIRD TRIMESTER: ICD-10-CM

## 2024-09-25 PROCEDURE — 96372 THER/PROPH/DIAG INJ SC/IM: CPT | Performed by: OBSTETRICS & GYNECOLOGY

## 2024-09-25 PROCEDURE — 2500000004 HC RX 250 GENERAL PHARMACY W/ HCPCS (ALT 636 FOR OP/ED): Performed by: OBSTETRICS & GYNECOLOGY

## 2024-09-25 PROCEDURE — 90715 TDAP VACCINE 7 YRS/> IM: CPT | Performed by: OBSTETRICS & GYNECOLOGY

## 2024-09-25 PROCEDURE — 0501F PRENATAL FLOW SHEET: CPT | Performed by: OBSTETRICS & GYNECOLOGY

## 2024-09-25 NOTE — PROGRESS NOTES
"SUBJECTIVE  HPI: Faustino Golden \"Aishwarya\" is a 29 y.o.  at 28w2d here for RPNV. Denies contractions, bleeding, or LOF. Reports normal fetal movement.   Passed 2nd tri labs  Rhogam today  Tdap today. Flu declined.  Has birth classes starting Oct 9th. Recommend hospital tour.   Moving right now.   Discussed adding one extra healthy snack to continue to improve upon weight gain.    Problem List Items Addressed This Visit          Active Problems    28 weeks gestation of pregnancy ([facility])    Overview     Desired provider in labor: [x] CNM  [] Physician  [] Blood Products: [x] Yes, accepts [] No, needs counseling  [x] Initial BMI: 19.49   [x] Prenatal Labs: wnl  [x] Cervical Cancer Screening up to date  [x] Rh status: A neg neg  [x] Genetic Screening:  sp RR cfDNA - BOY  [x] NT US: (11-13 wks) nl  [x] Baby ASA (if indicated): na  [x] Pregnancy dated by: 12 week US    [x] Anatomy US: (19-20 wks) BOY, nl anatomy, breech, post plac, EFW 70%  [x] Federal Sterilization consent signed (if indicated): na  [x] 1hr GCT at 24-28wks: 87  [x] Rhogam (if indicated): 24  [] Fetal Surveillance (if indicated): na  [x] Tdap (27-32 wks, may be given up to 36 wks if initial window missed): 24  [] RSV (32-36 wks) (Sept. to end ):   [x] Flu Vaccine: declined    [] Breastfeeding:  [] Postpartum Birth control method:   [] GBS at 36 - 37 wks:  [] 39 weeks discussion of IOL vs. Expectant management:  [] Mode of delivery ( anticipated ):           Relevant Orders    Tdap vaccine, age 7 years and older  (BOOSTRIX)    Encounter for supervision of normal first pregnancy in first trimester ([facility])    Overview     Covid vaccinated x 2; declines current vaccination.  Flu vaccinated.   Normal rr NIPS -- It's a BOY!          Rh negative state in antepartum period ([facility]) - Primary    Overview     A neg neg  For Rhogam at 28 weeks.         Relevant Medications    Rho(D) immune globulin (RHOGAM) injection 300 mcg (Start on " 9/25/2024  5:00 PM)     Other Visit Diagnoses       Need for Tdap vaccination              Orders Placed This Encounter   Procedures    Tdap vaccine, age 7 years and older  (BOOSTRIX)     RTC in 4 weeks    Odilia Martinez MD

## 2024-09-30 ENCOUNTER — APPOINTMENT (OUTPATIENT)
Dept: OBSTETRICS AND GYNECOLOGY | Facility: CLINIC | Age: 29
End: 2024-09-30
Payer: COMMERCIAL

## 2024-10-16 ENCOUNTER — ROUTINE PRENATAL (OUTPATIENT)
Dept: OBSTETRICS AND GYNECOLOGY | Facility: CLINIC | Age: 29
End: 2024-10-16
Payer: COMMERCIAL

## 2024-10-16 VITALS — BODY MASS INDEX: 22.28 KG/M2 | WEIGHT: 125.8 LBS | SYSTOLIC BLOOD PRESSURE: 117 MMHG | DIASTOLIC BLOOD PRESSURE: 78 MMHG

## 2024-10-16 DIAGNOSIS — Z67.91 RH NEGATIVE STATE IN ANTEPARTUM PERIOD (HHS-HCC): ICD-10-CM

## 2024-10-16 DIAGNOSIS — Z3A.31 31 WEEKS GESTATION OF PREGNANCY (HHS-HCC): ICD-10-CM

## 2024-10-16 DIAGNOSIS — Z23 NEED FOR INFLUENZA VACCINATION: ICD-10-CM

## 2024-10-16 DIAGNOSIS — O26.899 RH NEGATIVE STATE IN ANTEPARTUM PERIOD (HHS-HCC): ICD-10-CM

## 2024-10-16 DIAGNOSIS — O26.843 UTERINE SIZE-DATE DISCREPANCY IN THIRD TRIMESTER (HHS-HCC): Primary | ICD-10-CM

## 2024-10-16 PROCEDURE — 0501F PRENATAL FLOW SHEET: CPT | Performed by: OBSTETRICS & GYNECOLOGY

## 2024-10-16 PROCEDURE — 90656 IIV3 VACC NO PRSV 0.5 ML IM: CPT | Performed by: OBSTETRICS & GYNECOLOGY

## 2024-10-16 ASSESSMENT — EDINBURGH POSTNATAL DEPRESSION SCALE (EPDS)
I HAVE BLAMED MYSELF UNNECESSARILY WHEN THINGS WENT WRONG: NOT VERY OFTEN
TOTAL SCORE: 4
I HAVE BEEN ANXIOUS OR WORRIED FOR NO GOOD REASON: HARDLY EVER
I HAVE BEEN SO UNHAPPY THAT I HAVE HAD DIFFICULTY SLEEPING: NOT AT ALL
I HAVE FELT SAD OR MISERABLE: NO, NOT AT ALL
I HAVE BEEN SO UNHAPPY THAT I HAVE BEEN CRYING: NO, NEVER
I HAVE FELT SCARED OR PANICKY FOR NO GOOD REASON: NO, NOT MUCH
THINGS HAVE BEEN GETTING ON TOP OF ME: NO, MOST OF THE TIME I HAVE COPED QUITE WELL
THE THOUGHT OF HARMING MYSELF HAS OCCURRED TO ME: NEVER
I HAVE BEEN ABLE TO LAUGH AND SEE THE FUNNY SIDE OF THINGS: AS MUCH AS I ALWAYS COULD
I HAVE LOOKED FORWARD WITH ENJOYMENT TO THINGS: AS MUCH AS I EVER DID

## 2024-10-16 NOTE — PROGRESS NOTES
"SUBJECTIVE  HPI: Faustino Golden \"Aishwarya\" is a 29 y.o.  at 31w2d here for RPNV. Denies contractions, bleeding, or LOF. Reports normal fetal movement.   Pregnancy weight gain low, only about 1 lb in 1 month. S < D. Recommend growth scan.   Flu today. RSV next visit    Problem List Items Addressed This Visit          Active Problems    28 weeks gestation of pregnancy ([facility])    Overview     Desired provider in labor: [x] CNM  [] Physician  [] Blood Products: [x] Yes, accepts [] No, needs counseling  [x] Initial BMI: 19.49   [x] Prenatal Labs: wnl  [x] Cervical Cancer Screening up to date  [x] Rh status: A neg neg  [x] Genetic Screening:  sp RR cfDNA - BOY  [x] NT US: (11-13 wks) nl  [x] Baby ASA (if indicated): na  [x] Pregnancy dated by: 12 week US    [x] Anatomy US: (19-20 wks) BOY, nl anatomy, breech, post plac, EFW 70%  [x] Federal Sterilization consent signed (if indicated): na  [x] 1hr GCT at 24-28wks: 87  [x] Rhogam (if indicated): 24  [] Fetal Surveillance (if indicated): na  [x] Tdap (27-32 wks, may be given up to 36 wks if initial window missed): 24  [] RSV (32-36 wks) (Sept. to end of ):   [x] Flu Vaccine: 10/16/24    [] Breastfeeding:  [] Postpartum Birth control method:   [] GBS at 36 - 37 wks:  [] 39 weeks discussion of IOL vs. Expectant management:  [] Mode of delivery ( anticipated ):           Rh negative state in antepartum period ([facility])    Overview     A neg neg  For Rhogam at 28 weeks.          Other Visit Diagnoses       Uterine size-date discrepancy in third trimester ([facility])    -  Primary    Relevant Orders    US MAC OB imaging order    Need for influenza vaccination        Relevant Orders    Flu vaccine, trivalent, preservative free, age 6 months and greater (Fluraix/Fluzone/Flulaval)            Orders Placed This Encounter   Procedures    US MAC OB imaging order    Flu vaccine, trivalent, preservative free, age 6 months and greater (Fluraix/Fluzone/Flulaval)     RTC in " 2 weeks    Odilia Martinez MD

## 2024-10-23 ENCOUNTER — HOSPITAL ENCOUNTER (OUTPATIENT)
Dept: RADIOLOGY | Facility: CLINIC | Age: 29
Discharge: HOME | End: 2024-10-23
Payer: COMMERCIAL

## 2024-10-23 DIAGNOSIS — O26.843 UTERINE SIZE-DATE DISCREPANCY IN THIRD TRIMESTER (HHS-HCC): ICD-10-CM

## 2024-10-23 PROCEDURE — 76816 OB US FOLLOW-UP PER FETUS: CPT

## 2024-10-23 PROCEDURE — 76816 OB US FOLLOW-UP PER FETUS: CPT | Performed by: OBSTETRICS & GYNECOLOGY

## 2024-10-30 ENCOUNTER — ROUTINE PRENATAL (OUTPATIENT)
Dept: OBSTETRICS AND GYNECOLOGY | Facility: CLINIC | Age: 29
End: 2024-10-30
Payer: COMMERCIAL

## 2024-10-30 VITALS — BODY MASS INDEX: 22.85 KG/M2 | DIASTOLIC BLOOD PRESSURE: 72 MMHG | SYSTOLIC BLOOD PRESSURE: 112 MMHG | WEIGHT: 129 LBS

## 2024-10-30 DIAGNOSIS — O26.899 RH NEGATIVE STATE IN ANTEPARTUM PERIOD (HHS-HCC): ICD-10-CM

## 2024-10-30 DIAGNOSIS — Z29.11 NEED FOR RSV IMMUNIZATION: ICD-10-CM

## 2024-10-30 DIAGNOSIS — Z3A.33 33 WEEKS GESTATION OF PREGNANCY (HHS-HCC): Primary | ICD-10-CM

## 2024-10-30 DIAGNOSIS — Z71.85 VACCINE COUNSELING: ICD-10-CM

## 2024-10-30 DIAGNOSIS — Z67.91 RH NEGATIVE STATE IN ANTEPARTUM PERIOD (HHS-HCC): ICD-10-CM

## 2024-10-30 PROCEDURE — 0501F PRENATAL FLOW SHEET: CPT | Performed by: OBSTETRICS & GYNECOLOGY

## 2024-10-30 PROCEDURE — 90678 RSV VACC PREF BIVALENT IM: CPT | Performed by: OBSTETRICS & GYNECOLOGY

## 2024-11-13 ENCOUNTER — ROUTINE PRENATAL (OUTPATIENT)
Dept: OBSTETRICS AND GYNECOLOGY | Facility: CLINIC | Age: 29
End: 2024-11-13
Payer: COMMERCIAL

## 2024-11-13 VITALS — SYSTOLIC BLOOD PRESSURE: 117 MMHG | WEIGHT: 132.2 LBS | BODY MASS INDEX: 23.42 KG/M2 | DIASTOLIC BLOOD PRESSURE: 75 MMHG

## 2024-11-13 DIAGNOSIS — R35.0 FREQUENT URINATION: ICD-10-CM

## 2024-11-13 DIAGNOSIS — O26.899 RH NEGATIVE STATE IN ANTEPARTUM PERIOD (HHS-HCC): ICD-10-CM

## 2024-11-13 DIAGNOSIS — Z34.03 ENCOUNTER FOR SUPERVISION OF NORMAL FIRST PREGNANCY IN THIRD TRIMESTER: Primary | ICD-10-CM

## 2024-11-13 DIAGNOSIS — Z3A.35 35 WEEKS GESTATION OF PREGNANCY (HHS-HCC): ICD-10-CM

## 2024-11-13 DIAGNOSIS — Z67.91 RH NEGATIVE STATE IN ANTEPARTUM PERIOD (HHS-HCC): ICD-10-CM

## 2024-11-13 LAB
POC APPEARANCE, URINE: CLEAR
POC BILIRUBIN, URINE: NEGATIVE
POC BLOOD, URINE: NEGATIVE
POC COLOR, URINE: YELLOW
POC GLUCOSE, URINE: NEGATIVE MG/DL
POC KETONES, URINE: ABNORMAL MG/DL
POC LEUKOCYTES, URINE: NEGATIVE
POC NITRITE,URINE: NEGATIVE
POC PH, URINE: 7 PH
POC PROTEIN, URINE: NEGATIVE MG/DL
POC SPECIFIC GRAVITY, URINE: 1.02
POC UROBILINOGEN, URINE: 0.2 EU/DL

## 2024-11-13 PROCEDURE — 0501F PRENATAL FLOW SHEET: CPT | Performed by: OBSTETRICS & GYNECOLOGY

## 2024-11-13 PROCEDURE — 87081 CULTURE SCREEN ONLY: CPT | Performed by: OBSTETRICS & GYNECOLOGY

## 2024-11-13 PROCEDURE — 81003 URINALYSIS AUTO W/O SCOPE: CPT | Mod: QW | Performed by: OBSTETRICS & GYNECOLOGY

## 2024-11-13 NOTE — PROGRESS NOTES
"SUBJECTIVE  HPI: Faustino Golden \"Aishwarya\" is a 29 y.o.  at 35w2d here for RPNV. Denies contractions, bleeding, or LOF. Reports normal fetal movement.   GBS today as pt doesn't have fu until 38 weeks    Problem List Items Addressed This Visit          Active Problems    35 weeks gestation of pregnancy ([facility]) - Primary    Overview     Desired provider in labor: [x] CNM  [] Physician  [] Blood Products: [x] Yes, accepts [] No, needs counseling  [x] Initial BMI: 19.49   [x] Prenatal Labs: wnl  [x] Cervical Cancer Screening up to date  [x] Rh status: A neg neg  [x] Genetic Screening:  sp RR cfDNA - BOY  [x] NT US: (11-13 wks) nl  [x] Baby ASA (if indicated): na  [x] Pregnancy dated by: 12 week US    [x] Anatomy US: (19-20 wks) BOY, nl anatomy, breech, post plac, EFW 70%  [x] Federal Sterilization consent signed (if indicated): na  [x] 1hr GCT at 24-28wks: 87  [x] Rhogam (if indicated): 24  [x] Fetal Surveillance (if indicated): nl growth for S< D at 33 weeks EFW 68%, ceph  [x] Tdap (27-32 wks, may be given up to 36 wks if initial window missed): 24  [x] RSV (32-36 wks) (Sept. to end of ): 10/30/24  [x] Flu Vaccine: 10/16/24    [] Breastfeeding:  [] Postpartum Birth control method:   [] GBS at 36 - 37 wks:  [] 39 weeks discussion of IOL vs. Expectant management:  [] Mode of delivery ( anticipated ):           Relevant Orders    Group B Streptococcus (GBS) Prenatal Screen, Culture    Encounter for supervision of normal first pregnancy in first trimester    Overview     Covid vaccinated x 2; declines current vaccination.  Flu vaccinated.   Normal rr NIPS -- It's a BOY!          Rh negative state in antepartum period ([facility])    Overview     A neg neg  Rhogam 24          Orders Placed This Encounter   Procedures    Group B Streptococcus (GBS) Prenatal Screen, Culture     RTC in 2 weeks    Odilia Martinez MD   "

## 2024-11-16 LAB — GP B STREP GENITAL QL CULT: NORMAL

## 2024-12-02 ENCOUNTER — ROUTINE PRENATAL (OUTPATIENT)
Dept: OBSTETRICS AND GYNECOLOGY | Facility: CLINIC | Age: 29
End: 2024-12-02
Payer: COMMERCIAL

## 2024-12-02 VITALS — SYSTOLIC BLOOD PRESSURE: 123 MMHG | DIASTOLIC BLOOD PRESSURE: 76 MMHG | BODY MASS INDEX: 23.74 KG/M2 | WEIGHT: 134 LBS

## 2024-12-02 DIAGNOSIS — Z3A.38 38 WEEKS GESTATION OF PREGNANCY (HHS-HCC): Primary | ICD-10-CM

## 2024-12-02 DIAGNOSIS — Z67.91 RH NEGATIVE STATE IN ANTEPARTUM PERIOD (HHS-HCC): ICD-10-CM

## 2024-12-02 DIAGNOSIS — O26.899 RH NEGATIVE STATE IN ANTEPARTUM PERIOD (HHS-HCC): ICD-10-CM

## 2024-12-02 PROCEDURE — 0501F PRENATAL FLOW SHEET: CPT | Performed by: OBSTETRICS & GYNECOLOGY

## 2024-12-02 NOTE — PROGRESS NOTES
"SUBJECTIVE  HPI: Faustino Golden \"Aishwarya\" is a 29 y.o.  at 38w0d here for RPNV. Denies contractions, bleeding, or LOF. Reports normal fetal movement.   GBS neg  Discussed induction vs expectant mgmt. Pt leaning towards expectant as long as she has no medical concerns.    colostrum expression handout given    Problem List Items Addressed This Visit          Active Problems    38 weeks gestation of pregnancy ([facility]) - Primary    Overview     Desired provider in labor: [x] CNM  [] Physician  [] Blood Products: [x] Yes, accepts [] No, needs counseling  [x] Initial BMI: 19.49   [x] Prenatal Labs: wnl  [x] Cervical Cancer Screening up to date  [x] Rh status: A neg neg  [x] Genetic Screening:  sp RR cfDNA - BOY  [x] NT US: (11-13 wks) nl  [x] Baby ASA (if indicated): na  [x] Pregnancy dated by: 12 week US    [x] Anatomy US: (19-20 wks) BOY, nl anatomy, breech, post plac, EFW 70%  [x] Federal Sterilization consent signed (if indicated): na  [x] 1hr GCT at 24-28wks: 87  [x] Rhogam (if indicated): 24  [x] Fetal Surveillance (if indicated): nl growth for S< D at 33 weeks EFW 68%, ceph  [x] Tdap (27-32 wks, may be given up to 36 wks if initial window missed): 24  [x] RSV (32-36 wks) (Sept. to end ): 10/30/24  [x] Flu Vaccine: 10/16/24    [x] Breastfeeding: planned  [x] Postpartum Birth control method: IUD  [x] GBS at 36 - 37 wks: neg  [x] 39 weeks discussion of IOL vs. Expectant management: expectant  [x] Mode of delivery ( anticipated ):            Rh negative state in antepartum period ([facility])    Overview     A neg neg  Rhogam 24            No orders of the defined types were placed in this encounter.      RTC in 1 weeks    Odilia Martinez MD   "

## 2024-12-09 ENCOUNTER — ROUTINE PRENATAL (OUTPATIENT)
Dept: OBSTETRICS AND GYNECOLOGY | Facility: CLINIC | Age: 29
End: 2024-12-09
Payer: COMMERCIAL

## 2024-12-09 VITALS — SYSTOLIC BLOOD PRESSURE: 115 MMHG | BODY MASS INDEX: 24.06 KG/M2 | DIASTOLIC BLOOD PRESSURE: 71 MMHG | WEIGHT: 135.8 LBS

## 2024-12-09 DIAGNOSIS — O26.899 RH NEGATIVE STATE IN ANTEPARTUM PERIOD (HHS-HCC): ICD-10-CM

## 2024-12-09 DIAGNOSIS — Z3A.39 39 WEEKS GESTATION OF PREGNANCY (HHS-HCC): ICD-10-CM

## 2024-12-09 DIAGNOSIS — Z67.91 RH NEGATIVE STATE IN ANTEPARTUM PERIOD (HHS-HCC): ICD-10-CM

## 2024-12-09 DIAGNOSIS — Z34.03 ENCOUNTER FOR SUPERVISION OF NORMAL FIRST PREGNANCY IN THIRD TRIMESTER: Primary | ICD-10-CM

## 2024-12-09 PROCEDURE — 0501F PRENATAL FLOW SHEET: CPT | Performed by: OBSTETRICS & GYNECOLOGY

## 2024-12-09 NOTE — PROGRESS NOTES
"SUBJECTIVE  HPI: Faustino Golden \"Aishwarya\" is a 29 y.o.  at 39w0d here for RPNV. Denies contractions, bleeding, or LOF. Reports normal fetal movement.     Problem List Items Addressed This Visit          Active Problems    39 weeks gestation of pregnancy ([facility]) - Primary    Overview     Desired provider in labor: [x] CNM  [] Physician  [] Blood Products: [x] Yes, accepts [] No, needs counseling  [x] Initial BMI: 19.49   [x] Prenatal Labs: wnl  [x] Cervical Cancer Screening up to date  [x] Rh status: A neg neg  [x] Genetic Screening:  sp RR cfDNA - BOY  [x] NT US: (11-13 wks) nl  [x] Baby ASA (if indicated): na  [x] Pregnancy dated by: 12 week US    [x] Anatomy US: (19-20 wks) BOY, nl anatomy, breech, post plac, EFW 70%  [x] Federal Sterilization consent signed (if indicated): na  [x] 1hr GCT at 24-28wks: 87  [x] Rhogam (if indicated): 24  [x] Fetal Surveillance (if indicated): nl growth for S< D at 33 weeks EFW 68%, ceph  [x] Tdap (27-32 wks, may be given up to 36 wks if initial window missed): 24  [x] RSV (32-36 wks) (Sept. to end of ): 10/30/24  [x] Flu Vaccine: 10/16/24    [x] Breastfeeding: planned  [x] Postpartum Birth control method: IUD  [x] GBS at 36 - 37 wks: neg  [x] 39 weeks discussion of IOL vs. Expectant management: expectant  [x] Mode of delivery ( anticipated ):            Rh negative state in antepartum period ([facility])    Overview     A neg neg  Rhogam 24          No orders of the defined types were placed in this encounter.    RTC in 1 weeks with growth scan at that time    Odilia Martinez MD   "

## 2024-12-09 NOTE — PATIENT INSTRUCTIONS
DR. MARTINEZ'S DELIVERY GUIDE    HOW SHOULD I PREPARE?  Think about what you want your delivery to be like and let your team know  Don't hesitate to speak up if you have concerns or questions  Stay mentally flexible - even with modern medicine, delivery can be unpredictable!  Make sure you have reliable and timely transportation to the hospital  Keep your gas tank at least ¼ full  Keep a towel in the car (to catch amniotic fluid if your water breaks)  Plan for childcare, weather-related traffic delays, etc.  Hospital bag suggestions: birth plan, phone chargers, personal toiletries, hair accessories, chewing gum, water bottle, personal pillow or blanket, things to help you relax during labor, change of clothes for postpartum, nursing bra, eye mask and ear plugs, flip flops for shower, personal towel for shower, baby clothes for going home (bring 1 size up and down from expected size)    WHEN SHOULD I CALL?  When your water breaks (can feel like a gush, or a non-stop trickle of fluid)  When your contractions have been happening regularly for at least 2 hours non-stop AND  First Delivery: Contractions are occurring every 5 minutes or less  Repeat Delivery/Planned : Contractions are occurring every 8 minutes or less  If the baby is moving less than usual   If you experience bleeding like a period  For any other symptoms that just doesn't feel “right” to you    WHO DO I CALL?  Call [phone] for the [facility] office and [phone] for the [place] office. During the day, ask the  to direct you to the office nurse. After hours, you will be directed to the doctor on Labor & Delivery by the answering service. Please make sure to tell the answering service which L&D location you plan to go to so they can contact the correct on call provider.    WHERE DO I DELIVER?  Low risk pregnancies have the option to deliver at any  hospital. High risk pregnancies should deliver at [facility]. Dr. Martinez  only delivers at [facility], typically on Thursday nights. To schedule a hospital tour, call [phone].    HOW DO I GET THERE?  [facility] at [facility]: [address], [place]. This will take you to the entrance of [facility], which connects to [facility]. The parking garage is also closest to this address, and there is a  as well. Walk through [facility] towards the atrium, then [facility] will be on your right just past the [employer] shop. Labor and Delivery is located on the 2nd floor via elevator.   [facility]  [place] at [facility]: [address]. Entrance is located between the Emergency Department and the Main Hospital building. Look for the [employer] logo above the entrance. You can also enter from the entrance to the [facility] where there is a  - just turn right past the information desk and walk down the gallery zazueta until you reach [facility]. Labor and Delivery is located on the 3rd floor via elevator.    WHO CAN BE WITH ME?  The visitor policy can be found online at [email or web address].   Certified doulas do not count as visitors.     HOW CAN I DECREASE MY RISK FOR  DELIVERY?  Keep your body as strong and healthy as possible  Look through the [employer]® website to understand how movement can help your baby get in the right place in the pelvis   Use professional birth support, such as a    Change positions frequently during labor  Stay well hydrated throughout your labor, including “clears” for caloric nourishment (honey water, apple juice, etc.)   Allow enough time for labor - it can take over 24 hours!  Rest when you can so you are mentally ready to push once you get to 10cm dilated  Try pushing in a different position if you're not making progress. Consider pushing on hands and knees, closed  knee pushing, use of a birthing bar, etc.     Delivery is a team effort. Please speak up if you don't feel included in the decision-making.       DR. ESQUIVEL'S POSTPARTUM GUIDE    Going through all the physical and emotional changes of pregnancy is no small feat, and it's important to realize these changes continue even after delivery. Only about 50% of women go to their postpartum visits, which means a lot of women are missing out on an opportunity to check on their own health, receive support, and ask important questions. Every woman should have a check-up 4-6 weeks after their delivery, and some women will need to be seen even sooner than that.    We highly encourage you to take your postpartum care just as seriously as your prenatal care. Many serious long-term health issues related to pregnancy actually happen postpartum. Our goal is to help you feel supported, capable, and safe as you navigate your unique post-pregnancy journey.    SAFETY  You should immediately contact the doctor's office if you experience any of the following:  Chest pain or trouble breathing  Blood pressures >150/100, that is just as high or higher when repeated 20 minutes later  Severe abdominal pain not responding to your discharge pain medications  Heavy bleeding fully soaking a pad in under 30 minutes or soiling your clothes   Chills, shakes, or fever >100.4°F  Suicidal thoughts  You should also call anytime you just don't feel right - it is always better to be safe than sorry!    COMMON POSTPARTUM CONCERNS  A variety of other postpartum issues are just as harmful to a woman's wellbeing and health, even if they are not deadly. Please call for an appointment if you have concerns about the following, or any other bothersome issue:  Breastfeeding difficulties  Mood changes such as depression or anxiety  Pain during sex or with activity  Abnormal uterine bleeding past 6-8 weeks postpartum  You are not alone, and we are here to help  you!    WHAT TO EXPECT AT YOUR POSTPARTUM VISIT  During your postpartum visit, we will ask you questions about your pregnancy complications, postpartum mood and support, infant feeding journey, contraceptive plans, and more. If your concerns require a more in-depth evaluation, we may ask you to come back for a follow-up visit. Certain follow-up visits may be billed outside of your insurance's OB global package.     POSTPARTUM SUPPORT RESOURCES    [facility] POSTPARTUM PROGRAMMING  https://[email or web address]/services/obgy-[facility]/patient-resources/classes-and-support   [phone]  Free parenting support offered via “Mommy and Baby Too!” peer groups and WIC-lead “Healthy Mom and Baby” programs.    [facility] LACTATION SUPPORT  http://[email or web address]/selvin/health-[email or web address]-wellness/pregnancy-resources/lactation-services  [phone]  In person and virtual appointments offered at multiple locations for breastfeeding support.    BREASTFEEDING MEDICINE [facility]  https://[employer]/  [phone]. Provides full spectrum breastfeeding assistance. Located in the [facility] building, but open to parents seeing other pediatricians.    [facility] PELVIC FLOOR PHYSICAL THERAPY  https://www.[facility].org/services/obgy-[facility]/female-pelvic-health/conditions-and-treatments/hqjgzr-jczir-jlcphivzgarxda  [phone]  Licensed female therapists help with a variety of postpartum pelvic floor concerns, including pain, weakness, incontinence, and more. Referral may be required.     [facility] WOMEN'S MENTAL HEALTH CLINIC  https://www.[email or web address].org/services/psychiatry/conditions-treatments/womens-mental-health   [phone], ask for “Women's Mental Health” providers for help with postpartum anxiety, depression, OCD, PTSD, and more.    HELP ME GROW  http://www.helpmegrow.[place].gov/  Help Me Grow is an evidence-based program that promotes healthy  growth and development for babies and young children by providing professional support in the home for pregnant mothers or new parents. You can self-refer through the website or ask your doctor to make a referral.    LOOKING FOR PROFESSIONAL POSTPARTUM SUPPORT?  [employer] ( Certifying Organization)  https://www.damian.org/  Type in your zip code to find a certified postpartum  near you    [employer]  http://www.birthNew England Baptist HospitalSuitMe.org/    THE [facility]  http://[email or web address]    [employer] appening   [email or web address]

## 2024-12-16 ENCOUNTER — HOSPITAL ENCOUNTER (OUTPATIENT)
Dept: RADIOLOGY | Facility: CLINIC | Age: 29
Discharge: HOME | End: 2024-12-16
Payer: COMMERCIAL

## 2024-12-16 ENCOUNTER — ROUTINE PRENATAL (OUTPATIENT)
Dept: OBSTETRICS AND GYNECOLOGY | Facility: CLINIC | Age: 29
End: 2024-12-16
Payer: COMMERCIAL

## 2024-12-16 VITALS — DIASTOLIC BLOOD PRESSURE: 81 MMHG | SYSTOLIC BLOOD PRESSURE: 135 MMHG | WEIGHT: 136 LBS | BODY MASS INDEX: 24.09 KG/M2

## 2024-12-16 DIAGNOSIS — Z3A.40 40 WEEKS GESTATION OF PREGNANCY (HHS-HCC): ICD-10-CM

## 2024-12-16 DIAGNOSIS — Z3A.40 40 WEEKS GESTATION OF PREGNANCY (HHS-HCC): Primary | ICD-10-CM

## 2024-12-16 DIAGNOSIS — Z34.91 PRENATAL CARE IN FIRST TRIMESTER (HHS-HCC): ICD-10-CM

## 2024-12-16 PROCEDURE — 76816 OB US FOLLOW-UP PER FETUS: CPT

## 2024-12-16 PROCEDURE — 76819 FETAL BIOPHYS PROFIL W/O NST: CPT

## 2024-12-16 PROCEDURE — 0501F PRENATAL FLOW SHEET: CPT | Performed by: ADVANCED PRACTICE MIDWIFE

## 2024-12-16 PROCEDURE — 76819 FETAL BIOPHYS PROFIL W/O NST: CPT | Performed by: STUDENT IN AN ORGANIZED HEALTH CARE EDUCATION/TRAINING PROGRAM

## 2024-12-16 PROCEDURE — 76816 OB US FOLLOW-UP PER FETUS: CPT | Performed by: STUDENT IN AN ORGANIZED HEALTH CARE EDUCATION/TRAINING PROGRAM

## 2024-12-16 ASSESSMENT — ENCOUNTER SYMPTOMS
ENDOCRINE NEGATIVE: 0
MUSCULOSKELETAL NEGATIVE: 0
PSYCHIATRIC NEGATIVE: 0
CARDIOVASCULAR NEGATIVE: 0
HEMATOLOGIC/LYMPHATIC NEGATIVE: 0
CONSTITUTIONAL NEGATIVE: 0
EYES NEGATIVE: 0
ALLERGIC/IMMUNOLOGIC NEGATIVE: 0
NEUROLOGICAL NEGATIVE: 0
GASTROINTESTINAL NEGATIVE: 0
RESPIRATORY NEGATIVE: 0

## 2024-12-16 NOTE — PROGRESS NOTES
"Ob Follow-up  24     SUBJECTIVE    HPI: Faustino Golden \"Aishwarya\" is a 29 y.o.  at 40w0d here for RPNV.  She denies LOF, VB, endorses good FM.      OBJECTIVE  Visit Vitals  /81   Wt 61.7 kg (136 lb)   LMP 2024   BMI 24.09 kg/m²   OB Status Pregnant   Smoking Status Never   BSA 1.66 m²      ASSESSMENT & PLAN    Faustino Golden \"Aishwarya\" is a 29 y.o.  at 40w0d here for the following concerns we addressed today:    Problem List Items Addressed This Visit       40 weeks gestation of pregnancy ([facility]) - Primary    Overview     Desired provider in labor: [x] CNM  [] Physician  [] Blood Products: [x] Yes, accepts [] No, needs counseling  [x] Initial BMI: 19.49   [x] Prenatal Labs: wnl  [x] Cervical Cancer Screening up to date  [x] Rh status: A neg neg  [x] Genetic Screening:  sp RR cfDNA - BOY  [x] NT US: (11-13 wks) nl  [x] Baby ASA (if indicated): na  [x] Pregnancy dated by: 12 week US    [x] Anatomy US: (19-20 wks) BOY, nl anatomy, breech, post plac, EFW 70%  [x] Federal Sterilization consent signed (if indicated): na  [x] 1hr GCT at 24-28wks: 87  [x] Rhogam (if indicated): 24  [x] Fetal Surveillance (if indicated): nl growth for S< D at 33 weeks EFW 68%, ceph  [x] Tdap (27-32 wks, may be given up to 36 wks if initial window missed): 24  [x] RSV (32-36 wks) (Sept. to end ): 10/30/24  [x] Flu Vaccine: 10/16/24    [x] Breastfeeding: planned  [x] Postpartum Birth control method: IUD  [x] GBS at 36 - 37 wks: neg  [x] 39 weeks discussion of IOL vs. Expectant management: expectant  [x] Mode of delivery ( anticipated ):                 No orders of the defined types were placed in this encounter.   FMCs, labor/srom/bleeding precautions reviewed.   Discussed IOL recommendation by 40.6 wks. Faustino states she prefers to schedule IOL Monday, . RN to schedule.       NATALEE Moreno-MARIELLE     "

## 2024-12-20 ENCOUNTER — ROUTINE PRENATAL (OUTPATIENT)
Dept: OBSTETRICS AND GYNECOLOGY | Facility: CLINIC | Age: 29
End: 2024-12-20
Payer: COMMERCIAL

## 2024-12-20 VITALS — WEIGHT: 137 LBS | SYSTOLIC BLOOD PRESSURE: 124 MMHG | BODY MASS INDEX: 24.27 KG/M2 | DIASTOLIC BLOOD PRESSURE: 80 MMHG

## 2024-12-20 DIAGNOSIS — Z3A.40 40 WEEKS GESTATION OF PREGNANCY (HHS-HCC): Primary | ICD-10-CM

## 2024-12-20 NOTE — PROGRESS NOTES
"Ob Visit  24     SUBJECTIVE      HPI: Faustino Golden \"Aishwarya\" is a 29 y.o.  at 40w4d here for RPNV.  She has intermittent contractions, no bleeding, or no LOF. Reports normal fetal movement. Patient reports IOL scheduled for tomorrow 0800 at [facility]. Would like a membrane sweep today.       OBJECTIVE  Visit Vitals  /80   Wt 62.1 kg (137 lb)   LMP 2024   BMI 24.27 kg/m²   OB Status Pregnant   Smoking Status Never   BSA 1.66 m²            ASSESSMENT & PLAN    Faustino Golden \"Aishwarya\" is a 29 y.o.  at 40w4d here for the following concerns we addressed today:    Problem List Items Addressed This Visit          Ob-Gyn Problems    40 weeks gestation of pregnancy ([facility]) - Primary    Overview     12/15 40.0 wks EFW 3755g, 61%ile, AC 54%ile    Desired provider in labor: [x] CNM  [] Physician  [] Blood Products: [x] Yes, accepts [] No, needs counseling  [x] Initial BMI: 19.49   [x] Prenatal Labs: wnl  [x] Cervical Cancer Screening up to date  [x] Rh status: A neg neg  [x] Genetic Screening:  sp RR cfDNA - BOY  [x] NT US: (11-13 wks) nl  [x] Baby ASA (if indicated): na  [x] Pregnancy dated by: 12 week US    [x] Anatomy US: (19-20 wks) BOY, nl anatomy, breech, post plac, EFW 70%  [x] Federal Sterilization consent signed (if indicated): na  [x] 1hr GCT at 24-28wks: 87  [x] Rhogam (if indicated): 24  [x] Fetal Surveillance (if indicated): nl growth for S< D at 33 weeks EFW 68%, ceph  [x] Tdap (27-32 wks, may be given up to 36 wks if initial window missed): 24  [x] RSV (32-36 wks) (Sept. to end of ): 10/30/24  [x] Flu Vaccine: 10/16/24    [x] Breastfeeding: planned  [x] Postpartum Birth control method: IUD  [x] GBS at 36 - 37 wks: neg  [x] 39 weeks discussion of IOL vs. Expectant management: expectant  [x] Mode of delivery ( anticipated ):               Reviewed methods of IOL and options depending on favorability of cervix    RTC in 2 PP visit weeks      Rain Hidalgo, " APRN-CNM

## 2024-12-21 ENCOUNTER — ANESTHESIA (OUTPATIENT)
Dept: OBSTETRICS AND GYNECOLOGY | Facility: HOSPITAL | Age: 29
End: 2024-12-21
Payer: COMMERCIAL

## 2024-12-21 ENCOUNTER — HOSPITAL ENCOUNTER (INPATIENT)
Facility: HOSPITAL | Age: 29
End: 2024-12-21
Attending: OBSTETRICS & GYNECOLOGY | Admitting: ADVANCED PRACTICE MIDWIFE
Payer: COMMERCIAL

## 2024-12-21 ENCOUNTER — ANESTHESIA EVENT (OUTPATIENT)
Dept: OBSTETRICS AND GYNECOLOGY | Facility: HOSPITAL | Age: 29
End: 2024-12-21
Payer: COMMERCIAL

## 2024-12-21 ENCOUNTER — APPOINTMENT (OUTPATIENT)
Dept: OBSTETRICS AND GYNECOLOGY | Facility: HOSPITAL | Age: 29
End: 2024-12-21
Payer: COMMERCIAL

## 2024-12-21 DIAGNOSIS — Z3A.40 40 WEEKS GESTATION OF PREGNANCY (HHS-HCC): ICD-10-CM

## 2024-12-21 PROBLEM — Z34.90 ENCOUNTER FOR INDUCTION OF LABOR: Status: ACTIVE | Noted: 2024-12-21

## 2024-12-21 LAB
ABO GROUP (TYPE) IN BLOOD: NORMAL
ANTIBODY SCREEN: NORMAL
BB ANTIBODY IDENTIFICATION: NORMAL
CASE #: NORMAL
ERYTHROCYTE [DISTWIDTH] IN BLOOD BY AUTOMATED COUNT: 12.5 % (ref 11.5–14.5)
HCT VFR BLD AUTO: 36.1 % (ref 36–46)
HGB BLD-MCNC: 12.4 G/DL (ref 12–16)
MCH RBC QN AUTO: 32.3 PG (ref 26–34)
MCHC RBC AUTO-ENTMCNC: 34.3 G/DL (ref 32–36)
MCV RBC AUTO: 94 FL (ref 80–100)
NRBC BLD-RTO: 0 /100 WBCS (ref 0–0)
PLATELET # BLD AUTO: 179 X10*3/UL (ref 150–450)
RBC # BLD AUTO: 3.84 X10*6/UL (ref 4–5.2)
RH FACTOR (ANTIGEN D): NORMAL
WBC # BLD AUTO: 8.9 X10*3/UL (ref 4.4–11.3)

## 2024-12-21 PROCEDURE — 86780 TREPONEMA PALLIDUM: CPT | Mod: AHULAB | Performed by: ADVANCED PRACTICE MIDWIFE

## 2024-12-21 PROCEDURE — 85027 COMPLETE CBC AUTOMATED: CPT | Performed by: ADVANCED PRACTICE MIDWIFE

## 2024-12-21 PROCEDURE — 3700000014 EPIDURAL BLOCK: Performed by: NURSE ANESTHETIST, CERTIFIED REGISTERED

## 2024-12-21 PROCEDURE — 86901 BLOOD TYPING SEROLOGIC RH(D): CPT | Performed by: ADVANCED PRACTICE MIDWIFE

## 2024-12-21 PROCEDURE — 1120000001 HC OB PRIVATE ROOM DAILY

## 2024-12-21 PROCEDURE — 36415 COLL VENOUS BLD VENIPUNCTURE: CPT | Performed by: ADVANCED PRACTICE MIDWIFE

## 2024-12-21 PROCEDURE — 2500000004 HC RX 250 GENERAL PHARMACY W/ HCPCS (ALT 636 FOR OP/ED): Performed by: ADVANCED PRACTICE MIDWIFE

## 2024-12-21 PROCEDURE — 2500000001 HC RX 250 WO HCPCS SELF ADMINISTERED DRUGS (ALT 637 FOR MEDICARE OP): Performed by: ADVANCED PRACTICE MIDWIFE

## 2024-12-21 PROCEDURE — 2500000004 HC RX 250 GENERAL PHARMACY W/ HCPCS (ALT 636 FOR OP/ED): Performed by: NURSE ANESTHETIST, CERTIFIED REGISTERED

## 2024-12-21 RX ORDER — LOPERAMIDE HYDROCHLORIDE 2 MG/1
4 CAPSULE ORAL EVERY 2 HOUR PRN
Status: DISCONTINUED | OUTPATIENT
Start: 2024-12-21 | End: 2024-12-22

## 2024-12-21 RX ORDER — HYDRALAZINE HYDROCHLORIDE 20 MG/ML
5 INJECTION INTRAMUSCULAR; INTRAVENOUS ONCE AS NEEDED
Status: DISCONTINUED | OUTPATIENT
Start: 2024-12-21 | End: 2024-12-22

## 2024-12-21 RX ORDER — OXYTOCIN 10 [USP'U]/ML
10 INJECTION, SOLUTION INTRAMUSCULAR; INTRAVENOUS ONCE AS NEEDED
Status: DISCONTINUED | OUTPATIENT
Start: 2024-12-21 | End: 2024-12-22

## 2024-12-21 RX ORDER — METHYLERGONOVINE MALEATE 0.2 MG/ML
0.2 INJECTION INTRAVENOUS ONCE AS NEEDED
Status: DISCONTINUED | OUTPATIENT
Start: 2024-12-21 | End: 2024-12-22

## 2024-12-21 RX ORDER — FENTANYL/ROPIVACAINE/NS/PF 2MCG/ML-.2
0-25 PLASTIC BAG, INJECTION (ML) INJECTION CONTINUOUS
Status: DISCONTINUED | OUTPATIENT
Start: 2024-12-21 | End: 2024-12-22

## 2024-12-21 RX ORDER — TRANEXAMIC ACID 100 MG/ML
1000 INJECTION, SOLUTION INTRAVENOUS ONCE AS NEEDED
Status: DISCONTINUED | OUTPATIENT
Start: 2024-12-21 | End: 2024-12-22

## 2024-12-21 RX ORDER — METOCLOPRAMIDE HYDROCHLORIDE 5 MG/ML
10 INJECTION INTRAMUSCULAR; INTRAVENOUS EVERY 6 HOURS PRN
Status: DISCONTINUED | OUTPATIENT
Start: 2024-12-21 | End: 2024-12-22

## 2024-12-21 RX ORDER — MISOPROSTOL 200 UG/1
800 TABLET ORAL ONCE AS NEEDED
Status: DISCONTINUED | OUTPATIENT
Start: 2024-12-21 | End: 2024-12-22

## 2024-12-21 RX ORDER — ONDANSETRON 4 MG/1
4 TABLET, FILM COATED ORAL EVERY 6 HOURS PRN
Status: DISCONTINUED | OUTPATIENT
Start: 2024-12-21 | End: 2024-12-22

## 2024-12-21 RX ORDER — OXYTOCIN/0.9 % SODIUM CHLORIDE 30/500 ML
60 PLASTIC BAG, INJECTION (ML) INTRAVENOUS ONCE AS NEEDED
Status: DISCONTINUED | OUTPATIENT
Start: 2024-12-21 | End: 2024-12-22

## 2024-12-21 RX ORDER — ONDANSETRON HYDROCHLORIDE 2 MG/ML
4 INJECTION, SOLUTION INTRAVENOUS EVERY 6 HOURS PRN
Status: DISCONTINUED | OUTPATIENT
Start: 2024-12-21 | End: 2024-12-22

## 2024-12-21 RX ORDER — WATER
125 LIQUID (ML) MISCELLANEOUS
Status: DISCONTINUED | OUTPATIENT
Start: 2024-12-21 | End: 2024-12-22

## 2024-12-21 RX ORDER — METOCLOPRAMIDE 10 MG/1
10 TABLET ORAL EVERY 6 HOURS PRN
Status: DISCONTINUED | OUTPATIENT
Start: 2024-12-21 | End: 2024-12-22

## 2024-12-21 RX ORDER — LABETALOL HYDROCHLORIDE 5 MG/ML
20 INJECTION, SOLUTION INTRAVENOUS ONCE AS NEEDED
Status: DISCONTINUED | OUTPATIENT
Start: 2024-12-21 | End: 2024-12-22

## 2024-12-21 RX ORDER — CARBOPROST TROMETHAMINE 250 UG/ML
250 INJECTION, SOLUTION INTRAMUSCULAR ONCE AS NEEDED
Status: DISCONTINUED | OUTPATIENT
Start: 2024-12-21 | End: 2024-12-22

## 2024-12-21 RX ORDER — TERBUTALINE SULFATE 1 MG/ML
0.25 INJECTION SUBCUTANEOUS ONCE AS NEEDED
Status: DISCONTINUED | OUTPATIENT
Start: 2024-12-21 | End: 2024-12-22

## 2024-12-21 RX ORDER — NIFEDIPINE 10 MG/1
10 CAPSULE ORAL ONCE AS NEEDED
Status: DISCONTINUED | OUTPATIENT
Start: 2024-12-21 | End: 2024-12-22

## 2024-12-21 RX ORDER — LIDOCAINE HYDROCHLORIDE 10 MG/ML
30 INJECTION, SOLUTION INFILTRATION; PERINEURAL ONCE AS NEEDED
Status: DISCONTINUED | OUTPATIENT
Start: 2024-12-21 | End: 2024-12-22

## 2024-12-21 RX ADMIN — MISOPROSTOL 25 MCG: 100 TABLET ORAL at 14:06

## 2024-12-21 RX ADMIN — Medication 3 ML: at 23:48

## 2024-12-21 RX ADMIN — SODIUM CHLORIDE 500 ML: 9 INJECTION, SOLUTION INTRAVENOUS at 23:19

## 2024-12-21 RX ADMIN — Medication 10 ML/HR: at 23:50

## 2024-12-21 SDOH — SOCIAL STABILITY: SOCIAL INSECURITY: DOES ANYONE TRY TO KEEP YOU FROM HAVING/CONTACTING OTHER FRIENDS OR DOING THINGS OUTSIDE YOUR HOME?: NO

## 2024-12-21 SDOH — SOCIAL STABILITY: SOCIAL INSECURITY: HAS ANYONE EVER THREATENED TO HURT YOUR FAMILY OR YOUR PETS?: NO

## 2024-12-21 SDOH — HEALTH STABILITY: MENTAL HEALTH: WERE YOU ABLE TO COMPLETE ALL THE BEHAVIORAL HEALTH SCREENINGS?: YES

## 2024-12-21 SDOH — HEALTH STABILITY: MENTAL HEALTH: SUICIDAL BEHAVIOR (LIFETIME): NO

## 2024-12-21 SDOH — HEALTH STABILITY: MENTAL HEALTH: HAVE YOU USED ANY SUBSTANCES (CANABIS, COCAINE, HEROIN, HALLUCINOGENS, INHALANTS, ETC.) IN THE PAST 12 MONTHS?: NO

## 2024-12-21 SDOH — SOCIAL STABILITY: SOCIAL INSECURITY: HAVE YOU HAD THOUGHTS OF HARMING ANYONE ELSE?: NO

## 2024-12-21 SDOH — SOCIAL STABILITY: SOCIAL INSECURITY: VERBAL ABUSE: DENIES

## 2024-12-21 SDOH — HEALTH STABILITY: MENTAL HEALTH: WISH TO BE DEAD (PAST 1 MONTH): NO

## 2024-12-21 SDOH — SOCIAL STABILITY: SOCIAL INSECURITY: DO YOU FEEL ANYONE HAS EXPLOITED OR TAKEN ADVANTAGE OF YOU FINANCIALLY OR OF YOUR PERSONAL PROPERTY?: NO

## 2024-12-21 SDOH — SOCIAL STABILITY: SOCIAL INSECURITY: ARE THERE ANY APPARENT SIGNS OF INJURIES/BEHAVIORS THAT COULD BE RELATED TO ABUSE/NEGLECT?: NO

## 2024-12-21 SDOH — SOCIAL STABILITY: SOCIAL INSECURITY: ABUSE SCREEN: ADULT

## 2024-12-21 SDOH — HEALTH STABILITY: MENTAL HEALTH: NON-SPECIFIC ACTIVE SUICIDAL THOUGHTS (PAST 1 MONTH): NO

## 2024-12-21 SDOH — ECONOMIC STABILITY: HOUSING INSECURITY: DO YOU FEEL UNSAFE GOING BACK TO THE PLACE WHERE YOU ARE LIVING?: NO

## 2024-12-21 SDOH — SOCIAL STABILITY: SOCIAL INSECURITY: HAVE YOU HAD ANY THOUGHTS OF HARMING ANYONE ELSE?: NO

## 2024-12-21 SDOH — HEALTH STABILITY: MENTAL HEALTH: CURRENT SMOKER: 0

## 2024-12-21 SDOH — HEALTH STABILITY: MENTAL HEALTH: HAVE YOU USED ANY PRESCRIPTION DRUGS OTHER THAN PRESCRIBED IN THE PAST 12 MONTHS?: NO

## 2024-12-21 SDOH — SOCIAL STABILITY: SOCIAL INSECURITY: PHYSICAL ABUSE: DENIES

## 2024-12-21 SDOH — SOCIAL STABILITY: SOCIAL INSECURITY: ARE YOU OR HAVE YOU BEEN THREATENED OR ABUSED PHYSICALLY, EMOTIONALLY, OR SEXUALLY BY ANYONE?: NO

## 2024-12-21 ASSESSMENT — LIFESTYLE VARIABLES
HOW MANY STANDARD DRINKS CONTAINING ALCOHOL DO YOU HAVE ON A TYPICAL DAY: PATIENT DOES NOT DRINK
HOW OFTEN DO YOU HAVE A DRINK CONTAINING ALCOHOL: NEVER
AUDIT-C TOTAL SCORE: 0
HOW OFTEN DO YOU HAVE 6 OR MORE DRINKS ON ONE OCCASION: NEVER
AUDIT-C TOTAL SCORE: 0
SKIP TO QUESTIONS 9-10: 1

## 2024-12-21 ASSESSMENT — PATIENT HEALTH QUESTIONNAIRE - PHQ9
SUM OF ALL RESPONSES TO PHQ9 QUESTIONS 1 & 2: 0
2. FEELING DOWN, DEPRESSED OR HOPELESS: NOT AT ALL
1. LITTLE INTEREST OR PLEASURE IN DOING THINGS: NOT AT ALL

## 2024-12-21 NOTE — ANESTHESIA PREPROCEDURE EVALUATION
"Patient: Faustino Golden \"Aishwarya\"    Evaluation Method: Chart review    Procedure Information    Date: 12/21/24  Procedure: Labor Consult         Relevant Problems   Anesthesia (within normal limits)      Cardiac (within normal limits)      Pulmonary (within normal limits)      Neuro (within normal limits)      GI (within normal limits)      /Renal (within normal limits)      Liver (within normal limits)      Endocrine (within normal limits)      Hematology (within normal limits)      Musculoskeletal (within normal limits)      HEENT (within normal limits)      ID (within normal limits)      Skin (within normal limits)      GYN   (+) 40 weeks gestation of pregnancy ([facility])   (+) Encounter for supervision of normal first pregnancy in first trimester       Clinical information reviewed:    Allergies                NPO Detail:  No data recorded     OB/Gyn Evaluation    Present Pregnancy    Patient is pregnant now.   Obstetric History                Physical Exam    Airway  Mallampati: I  TM distance: >3 FB  Neck ROM: full     Cardiovascular - normal exam     Dental - normal exam     Pulmonary - normal exam     Abdominal - normal exam             Anesthesia Plan    History of general anesthesia?: yes  History of complications of general anesthesia?: no    ASA 2     epidural     The patient is not a current smoker.    Anesthetic plan and risks discussed with patient.  Use of blood products discussed with patient who consented to blood products.        "

## 2024-12-21 NOTE — H&P
OB Admission H&P    Assessment/Plan    Faustino Golden is a 29 y.o.  at 40w5d, [place]OR[place]: 2024, by Ultrasound, who presents for Induction of Labor.    Plan  -Admit to L&D, consented  -T&S, CBC, and Syphilis  -Epidural at patient request  -Recheck as clinically indicated by maternal or fetal status  -Plan to initiate induction with cytotec. 25mcg placed PV without difficulty.   -Dr. Crawford aware of admission and plan.     Fetal Status  -NST reactive, reassuring   -Presentation vertex based on ultrasound  -EFW  3755g by 40.0 wks US  -GBS negative    Postpartum  Contraception Plan:  IUD    Pregnancy Problems (from 24 to present)       Problem Noted Diagnosed Resolved    Encounter for induction of labor 2024 by NATALEE Moreno-MARIELLE  No    Priority:  Medium       40 weeks gestation of pregnancy ([facility]) 2024 by Odilia Martinez MD  No    Priority:  Medium       Overview Addendum 2024 12:52 PM by NATALEE Moreno-MARIELLE     12/15 40.0 wks EFW 3755g, 61%ile, AC 54%ile    Desired provider in labor: [x] CNM  [] Physician  [] Blood Products: [x] Yes, accepts [] No, needs counseling  [x] Initial BMI: 19.49   [x] Prenatal Labs: wnl  [x] Cervical Cancer Screening up to date  [x] Rh status: A neg neg  [x] Genetic Screening:  sp RR cfDNA - BOY  [x] NT US: (11-13 wks) nl  [x] Baby ASA (if indicated): na  [x] Pregnancy dated by: 12 week US    [x] Anatomy US: (19-20 wks) BOY, nl anatomy, breech, post plac, EFW 70%  [x] Federal Sterilization consent signed (if indicated): na  [x] 1hr GCT at 24-28wks: 87  [x] Rhogam (if indicated): 24  [x] Fetal Surveillance (if indicated): nl growth for S< D at 33 weeks EFW 68%, ceph  [x] Tdap (27-32 wks, may be given up to 36 wks if initial window missed): 24  [x] RSV (32-36 wks) (Sept. to end ): 10/30/24  [x] Flu Vaccine: 10/16/24    [x] Breastfeeding: planned  [x] Postpartum Birth control method: IUD  [x] GBS at 36 - 37 wks:  neg  [x] 39 weeks discussion of IOL vs. Expectant management: expectant  [x] Mode of delivery ( anticipated ):            Rh negative state in antepartum period ([facility]) 2024 by DEEP Fletcher  No    Priority:  Medium       Overview Addendum 10/30/2024  1:23 PM by Odilia Martinez MD     A neg neg  Rhogam 24         Encounter for supervision of normal first pregnancy in first trimester 2024 by DEEP Fletcher  No    Priority:  Medium       Overview Addendum 2024  8:13 AM by DEEP Fletcher     Covid vaccinated x 2; declines current vaccination.  Flu vaccinated.   Normal rr NIPS -- It's a BOY!                  Subjective   Faustino presents for term IOL. Denies any LOF, VB, ctxs. Endorses good FM.     Prenatal Provider Dr. Martinez    OB History    Para Term  AB Living   1 0 0 0 0 0   SAB IAB Ectopic Multiple Live Births   0 0 0 0 0      # Outcome Date GA Lbr Rai/2nd Weight Sex Type Anes PTL Lv   1 Current                Past Surgical History:   Procedure Laterality Date    OTHER SURGICAL HISTORY  2020    Oral Surgery       Social History     Tobacco Use    Smoking status: Never    Smokeless tobacco: Never   Substance Use Topics    Alcohol use: Not Currently       No Known Allergies    Medications Prior to Admission   Medication Sig Dispense Refill Last Dose/Taking    PRENATAL 2-IRON-FOLIC ACID-OM3 ORAL Take by mouth.   2024     Objective     Last Vitals  Temp Pulse Resp BP MAP O2 Sat     79   123/72 88 98 %     Blood Pressures         2024  1207 2024  1409          BP: 121/82 123/72               Physical Exam  General: NAD, mood appropriate  Cardiopulmonary: warm and well perfused, breathing comfortably on room air  Abdomen: Gravid, non-tender  Extremities: Symmetric  Speculum Exam: deferred  Cervix: 1 /80 /-2      Fetal Monitoring  , moderate, +accels, -decels  [place]: irregular    Bedside ultrasound: vertex    Labs in  chart were reviewed.  0   Lab Value Date/Time    GRPBSTREP No Group B Streptococcus (GBS) isolated 11/13/2024 1247     CBC   Recent Labs     12/21/24  1241   WBC 8.9   HGB 12.4   HCT 36.1         Results from last 7 days   Lab Units 12/21/24  1241   WBC AUTO x10*3/uL 8.9   HEMOGLOBIN g/dL 12.4   HEMATOCRIT % 36.1   PLATELETS AUTO x10*3/uL 179        Prenatal labs reviewed, not remarkable.

## 2024-12-21 NOTE — SIGNIFICANT EVENT
S: Aishwarya reports feeling well overall, starting to feel crampy.   O: , moderate, +accels, -decels      [place]: q 1-2 mins      CE: deferred  A: IUP at 40.5 wks      Cat I FHR      IOL--> cervical ripening phase  P: Discussed will defer cytotec #2 at this time given contraction frequency. Will reassess in 1-2 hrs. If contractions have spaced, can consider second cytotec dose. Can also examine and consider CRB at that time with or without additional cytotec. All questions answered.       cEFM.       Reassess in 1-2 hrs or sooner prn.     NATALEE Hanson-MARIELLE

## 2024-12-22 VITALS
SYSTOLIC BLOOD PRESSURE: 126 MMHG | OXYGEN SATURATION: 98 % | HEART RATE: 97 BPM | DIASTOLIC BLOOD PRESSURE: 75 MMHG | RESPIRATION RATE: 18 BRPM | TEMPERATURE: 98.7 F

## 2024-12-22 LAB — TREPONEMA PALLIDUM IGG+IGM AB [PRESENCE] IN SERUM OR PLASMA BY IMMUNOASSAY: NONREACTIVE

## 2024-12-22 PROCEDURE — 59409 OBSTETRICAL CARE: CPT | Performed by: ADVANCED PRACTICE MIDWIFE

## 2024-12-22 PROCEDURE — 0KQM0ZZ REPAIR PERINEUM MUSCLE, OPEN APPROACH: ICD-10-PCS | Performed by: ADVANCED PRACTICE MIDWIFE

## 2024-12-22 PROCEDURE — 59050 FETAL MONITOR W/REPORT: CPT

## 2024-12-22 PROCEDURE — 2500000004 HC RX 250 GENERAL PHARMACY W/ HCPCS (ALT 636 FOR OP/ED): Performed by: ADVANCED PRACTICE MIDWIFE

## 2024-12-22 PROCEDURE — 7210000002 HC LABOR PER HOUR

## 2024-12-22 PROCEDURE — 7100000016 HC LABOR RECOVERY PER HOUR

## 2024-12-22 PROCEDURE — 2500000005 HC RX 250 GENERAL PHARMACY W/O HCPCS: Performed by: ADVANCED PRACTICE MIDWIFE

## 2024-12-22 PROCEDURE — 0UH97HZ INSERTION OF CONTRACEPTIVE DEVICE INTO UTERUS, VIA NATURAL OR ARTIFICIAL OPENING: ICD-10-PCS | Performed by: ADVANCED PRACTICE MIDWIFE

## 2024-12-22 PROCEDURE — 58300 INSERT INTRAUTERINE DEVICE: CPT | Performed by: ADVANCED PRACTICE MIDWIFE

## 2024-12-22 PROCEDURE — 2500000001 HC RX 250 WO HCPCS SELF ADMINISTERED DRUGS (ALT 637 FOR MEDICARE OP): Performed by: ADVANCED PRACTICE MIDWIFE

## 2024-12-22 PROCEDURE — 59400 OBSTETRICAL CARE: CPT | Performed by: ADVANCED PRACTICE MIDWIFE

## 2024-12-22 PROCEDURE — 3E033VJ INTRODUCTION OF OTHER HORMONE INTO PERIPHERAL VEIN, PERCUTANEOUS APPROACH: ICD-10-PCS | Performed by: ADVANCED PRACTICE MIDWIFE

## 2024-12-22 PROCEDURE — 1100000001 HC PRIVATE ROOM DAILY

## 2024-12-22 PROCEDURE — 3E0P7VZ INTRODUCTION OF HORMONE INTO FEMALE REPRODUCTIVE, VIA NATURAL OR ARTIFICIAL OPENING: ICD-10-PCS | Performed by: ADVANCED PRACTICE MIDWIFE

## 2024-12-22 RX ORDER — LABETALOL HYDROCHLORIDE 5 MG/ML
20 INJECTION, SOLUTION INTRAVENOUS ONCE AS NEEDED
Status: DISCONTINUED | OUTPATIENT
Start: 2024-12-22 | End: 2024-12-23 | Stop reason: HOSPADM

## 2024-12-22 RX ORDER — OXYTOCIN/0.9 % SODIUM CHLORIDE 30/500 ML
60 PLASTIC BAG, INJECTION (ML) INTRAVENOUS ONCE AS NEEDED
Status: DISCONTINUED | OUTPATIENT
Start: 2024-12-22 | End: 2024-12-23 | Stop reason: HOSPADM

## 2024-12-22 RX ORDER — SIMETHICONE 80 MG
80 TABLET,CHEWABLE ORAL 4 TIMES DAILY PRN
Status: DISCONTINUED | OUTPATIENT
Start: 2024-12-22 | End: 2024-12-23 | Stop reason: HOSPADM

## 2024-12-22 RX ORDER — BISACODYL 10 MG/1
10 SUPPOSITORY RECTAL DAILY PRN
Status: DISCONTINUED | OUTPATIENT
Start: 2024-12-22 | End: 2024-12-23 | Stop reason: HOSPADM

## 2024-12-22 RX ORDER — TRANEXAMIC ACID 100 MG/ML
1000 INJECTION, SOLUTION INTRAVENOUS ONCE AS NEEDED
Status: DISCONTINUED | OUTPATIENT
Start: 2024-12-22 | End: 2024-12-23 | Stop reason: HOSPADM

## 2024-12-22 RX ORDER — ONDANSETRON 4 MG/1
4 TABLET, FILM COATED ORAL EVERY 6 HOURS PRN
Status: DISCONTINUED | OUTPATIENT
Start: 2024-12-22 | End: 2024-12-23 | Stop reason: HOSPADM

## 2024-12-22 RX ORDER — NIFEDIPINE 10 MG/1
10 CAPSULE ORAL ONCE AS NEEDED
Status: DISCONTINUED | OUTPATIENT
Start: 2024-12-22 | End: 2024-12-23 | Stop reason: HOSPADM

## 2024-12-22 RX ORDER — DIPHENHYDRAMINE HYDROCHLORIDE 50 MG/ML
25 INJECTION INTRAMUSCULAR; INTRAVENOUS EVERY 6 HOURS PRN
Status: DISCONTINUED | OUTPATIENT
Start: 2024-12-22 | End: 2024-12-23 | Stop reason: HOSPADM

## 2024-12-22 RX ORDER — METHYLERGONOVINE MALEATE 0.2 MG/ML
0.2 INJECTION INTRAVENOUS ONCE AS NEEDED
Status: DISCONTINUED | OUTPATIENT
Start: 2024-12-22 | End: 2024-12-23 | Stop reason: HOSPADM

## 2024-12-22 RX ORDER — IBUPROFEN 600 MG/1
600 TABLET ORAL EVERY 6 HOURS
Status: DISCONTINUED | OUTPATIENT
Start: 2024-12-22 | End: 2024-12-23 | Stop reason: HOSPADM

## 2024-12-22 RX ORDER — POLYETHYLENE GLYCOL 3350 17 G/17G
17 POWDER, FOR SOLUTION ORAL 2 TIMES DAILY PRN
Status: DISCONTINUED | OUTPATIENT
Start: 2024-12-22 | End: 2024-12-23 | Stop reason: HOSPADM

## 2024-12-22 RX ORDER — ADHESIVE BANDAGE
10 BANDAGE TOPICAL
Status: DISCONTINUED | OUTPATIENT
Start: 2024-12-22 | End: 2024-12-23 | Stop reason: HOSPADM

## 2024-12-22 RX ORDER — LIDOCAINE 560 MG/1
1 PATCH PERCUTANEOUS; TOPICAL; TRANSDERMAL
Status: DISCONTINUED | OUTPATIENT
Start: 2024-12-22 | End: 2024-12-23 | Stop reason: HOSPADM

## 2024-12-22 RX ORDER — OXYTOCIN 10 [USP'U]/ML
10 INJECTION, SOLUTION INTRAMUSCULAR; INTRAVENOUS ONCE AS NEEDED
Status: DISCONTINUED | OUTPATIENT
Start: 2024-12-22 | End: 2024-12-23 | Stop reason: HOSPADM

## 2024-12-22 RX ORDER — CARBOPROST TROMETHAMINE 250 UG/ML
250 INJECTION, SOLUTION INTRAMUSCULAR ONCE AS NEEDED
Status: DISCONTINUED | OUTPATIENT
Start: 2024-12-22 | End: 2024-12-23 | Stop reason: HOSPADM

## 2024-12-22 RX ORDER — DIPHENHYDRAMINE HCL 25 MG
25 CAPSULE ORAL EVERY 6 HOURS PRN
Status: DISCONTINUED | OUTPATIENT
Start: 2024-12-22 | End: 2024-12-23 | Stop reason: HOSPADM

## 2024-12-22 RX ORDER — LOPERAMIDE HYDROCHLORIDE 2 MG/1
4 CAPSULE ORAL EVERY 2 HOUR PRN
Status: DISCONTINUED | OUTPATIENT
Start: 2024-12-22 | End: 2024-12-23 | Stop reason: HOSPADM

## 2024-12-22 RX ORDER — MISOPROSTOL 200 UG/1
800 TABLET ORAL ONCE AS NEEDED
Status: DISCONTINUED | OUTPATIENT
Start: 2024-12-22 | End: 2024-12-23 | Stop reason: HOSPADM

## 2024-12-22 RX ORDER — OXYTOCIN/0.9 % SODIUM CHLORIDE 30/500 ML
2-30 PLASTIC BAG, INJECTION (ML) INTRAVENOUS CONTINUOUS
Status: DISCONTINUED | OUTPATIENT
Start: 2024-12-22 | End: 2024-12-22

## 2024-12-22 RX ORDER — HYDRALAZINE HYDROCHLORIDE 20 MG/ML
5 INJECTION INTRAMUSCULAR; INTRAVENOUS ONCE AS NEEDED
Status: DISCONTINUED | OUTPATIENT
Start: 2024-12-22 | End: 2024-12-23 | Stop reason: HOSPADM

## 2024-12-22 RX ORDER — ONDANSETRON HYDROCHLORIDE 2 MG/ML
4 INJECTION, SOLUTION INTRAVENOUS EVERY 6 HOURS PRN
Status: DISCONTINUED | OUTPATIENT
Start: 2024-12-22 | End: 2024-12-23 | Stop reason: HOSPADM

## 2024-12-22 RX ORDER — ACETAMINOPHEN 325 MG/1
975 TABLET ORAL EVERY 6 HOURS
Status: DISCONTINUED | OUTPATIENT
Start: 2024-12-22 | End: 2024-12-23 | Stop reason: HOSPADM

## 2024-12-22 RX ADMIN — WITCH HAZEL 1 EACH: 5 CLOTH TOPICAL at 13:14

## 2024-12-22 RX ADMIN — COPPER 1 EACH: 313.4 INTRAUTERINE DEVICE INTRAUTERINE at 10:16

## 2024-12-22 RX ADMIN — Medication 2 MILLI-UNITS/MIN: at 02:31

## 2024-12-22 RX ADMIN — ACETAMINOPHEN 975 MG: 325 TABLET, FILM COATED ORAL at 13:13

## 2024-12-22 RX ADMIN — ACETAMINOPHEN 975 MG: 325 TABLET, FILM COATED ORAL at 19:25

## 2024-12-22 RX ADMIN — IBUPROFEN 600 MG: 600 TABLET ORAL at 19:25

## 2024-12-22 RX ADMIN — BENZOCAINE AND LEVOMENTHOL 1 APPLICATION: 200; 5 SPRAY TOPICAL at 13:13

## 2024-12-22 RX ADMIN — IBUPROFEN 600 MG: 600 TABLET ORAL at 13:14

## 2024-12-22 ASSESSMENT — PAIN SCALES - GENERAL
PAINLEVEL_OUTOF10: 2
PAINLEVEL_OUTOF10: 2
PAINLEVEL_OUTOF10: 0 - NO PAIN
PAIN_LEVEL: 0
PAINLEVEL_OUTOF10: 3

## 2024-12-22 ASSESSMENT — PAIN DESCRIPTION - LOCATION: LOCATION: PERINEUM

## 2024-12-22 NOTE — PROGRESS NOTES
S: Pt pushing spontaneously with great effort.  O: , moderate variability, variable decels with ctxs intermittently, great recovery to baseline.  CE 10/100/+2, great movement of fetal head with maternal efforts  [place] Q 2-3 min, pitocin now restarted at 2mu/min  Maternal VSS  A: IUP at 40w6d  Stage 2, appropriate progress for nullip labor  Cat II FHR overall reassuring  GBS neg  P: Continue pitocin IOL per protocol  Continue spontaneous pushing efforts  Reassess for progress as indicated  Anticipate SVB  Dr. Crawford updated on pt status and plan.    Miesha Zamarripa, NATALEE-EMETERIOM

## 2024-12-22 NOTE — CARE PLAN
The patient's goals for the shift include to have a healthy labor and delivery    The clinical goals for the shift include fhr remains reassuring throughout labor and delivery

## 2024-12-22 NOTE — SIGNIFICANT EVENT
S: Aishwarya reports feeling increased rectal pressure, requesting cervical exam.   O: , moderate, -accels, +intermittent variable decelerations and prolonged deceleration while lying flat for straight cath       [place]: q 1-3 mins      CE: 10/100/0  A: IUP at 40.6 wks      Cat II FHR   P: Pitocin off d/t prolonged deceleration which resolved with maternal pushing efforts.       Will consider restarting pitocin at 3mu/min as FHT allows per guidelines.       Will begin maternal pushing efforts.       cEFM.       Anticipate .     DEEP Hanson

## 2024-12-22 NOTE — L&D DELIVERY NOTE
"OB Delivery Note  2024  Faustino Golden \"Aishwarya\"  29 y.o.   Vaginal, Spontaneous       Gestational Age: 40w6d  /Para:   Quantitative Blood Loss: Admission to Discharge: 500 mL (2024 11:38 AM - 2024 11:51 AM)    Brenna Golden [[id number]]      Labor Events    Sac identifier: Sac 1  Rupture date/time: 2024 1950  Rupture type: Spontaneous  Fluid color: Clear  Fluid odor: None  Labor type: Induced Onset of Labor  Labor allowed to proceed with plans for an attempted vaginal birth?: Yes  Induction: Misoprostol, Max/EASI, Oxytocin  First cervical ripening date/time: 2024 1400  Induction indications: Post-term Gestation  Complications: None       Labor Event Times    Labor onset date/time: 2024 1200  Dilation complete date/time: 2024 0614  Start pushing date/time: 2024 0650       Labor Length    1st stage: 18h 14m  2nd stage: 3h 55m  3rd stage: 0h 05m       Placenta    Placenta delivery date/time: 2024 1014  Placenta removal: Spontaneous  Placenta appearance: Intact  Placenta disposition: discarded       Cord    Vessels: 3 vessels  Complications: Nuchal  Nuchal intervention: reduced  Nuchal cord description: loose nuchal cord  Number of loops: 1  Delayed cord clamping?: Yes  Cord clamped date/time: 2024 10:15:00  Cord blood disposition: Lab  Gases sent?: No  Stem cell collection (by provider): No       Lacerations    Episiotomy: None  Perineal laceration: 2nd  Labial laceration?: Yes  Labial laceration location: bilateral  Labial laceration repaired?: Yes  Repair suture: 3-0 Synthetic Suture, 4-0 Synthetic Suture       Anesthesia    Method: Epidural       Operative Delivery    Forceps attempted?: No  Vacuum extractor attempted?: No       Shoulder Dystocia    Shoulder dystocia present?: No        Delivery    Time head delivered: 2024 10:09:00  Birth date/time: 2024 10:09:00  Delivery type: Vaginal, Spontaneous  Complications: " None       Resuscitation    Method: None       Apgars    Living status: Living  Apgar Component Scores:  1 min.:  5 min.:  10 min.:  15 min.:  20 min.:    Skin color:  1  1       Heart rate:  2  2       Reflex irritability:  2  2       Muscle tone:  2  2       Respiratory effort:  2  2       Total:  9  9              Delivery Providers    Delivering clinician: DEEP Richards   Provider Role    Nichelle Joyce, RN Delivery Nurse     Nursery Nurse     Resident                 Intrauterine Device Inserted: Yes, after vaginal delivery   Intrauterine Device Inserted: IUD Device Type: ParaGard  Ultrasound Guidance: No, but confirmed fundal placement on ultrasound following placement.    IUD after Vaginal Delivery Consent and Procedure Statement:     Consent: The risks, benefits, and alternatives to immediate postplacental intrauterine device insertion were discussed with the patient.  Specifically, we discussed the possible risks of bleeding, infection, perforation, failure, increased risk of ectopic should pregnancy occur, and the higher risk of expulsion.  Written consent was obtained prior to the procedure and is detailed in the patient’s record.      Procedure: The intrauterine device was placed at the fundus using standard technique.    DEEP Richards

## 2024-12-22 NOTE — SIGNIFICANT EVENT
S: Aishwarya reports feeling more abdominal pressure with contractions and feeling shaky, consents to cervical exam.   O: , moderate, -accels, -decels       [place]: q 2-3 mins       CE: 5/80/-1  A: IUP at 40.6 wks      Cat I FHT      IOL--> early labor  P: Discussed recommendation for pitocin at this time, Aishwarya agreeable.       RN currently emptying bladder then will position with peanut ball to aid in fetal descent and rotation (suspected OP).       Pitocin ordered.       Reassess in 2-4 hrs or sooner prn.       Anticipate .       Dr. Hernandez updated on pt status and plan.     NATALEE Hanson-MARIELLE

## 2024-12-22 NOTE — ANESTHESIA PROCEDURE NOTES
Epidural Block    Patient location during procedure: OB  Start time: 12/21/2024 11:35 PM  End time: 12/21/2024 11:54 PM  Reason for block: labor analgesia  Staffing  Performed: CRNA   Authorized by: HUGO Thomas    Performed by: HUGO Thomas    Preanesthetic Checklist  Completed: patient identified, IV checked, risks and benefits discussed, surgical consent, pre-op evaluation, timeout performed and sterile techniques followed  Block Timeout  RN/Licensed healthcare professional reads aloud to the Anesthesia provider and entire team: Patient identity, procedure with side and site, patient position, and as applicable the availability of implants/special equipment/special requirements.  Patient on coagulant treatment: no  Timeout performed at: 12/21/2024 11:38 PM  Block Placement  Patient position: sitting  Prep: ChloraPrep  Sterility prep: mask, hand, gloves, drape and cap  Sedation level: no sedation  Patient monitoring: heart rate, continuous pulse oximetry and blood pressure  Approach: midline  Local numbing: lidocaine 1% to skin and subcutaneous tissues  Vertebral space: lumbar  Lumbar location: L3-L4  Epidural  Loss of resistance technique: air  Guidance: landmark technique        Needle  Needle type: Tuohy   Needle gauge: 17  Needle length: 10.2 cm  Needle insertion depth: 3 cm  Catheter type: multi-orifice  Catheter at skin depth: 10 cm  Catheter securement method: clear occlusive dressing    Test dose: lidocaine 1.5% with epinephrine 1-to-200,000  Test dose: lidocaine 1.5% with epinephrine 1-to-200,000  Test dose result: no positive test dose    PCEA  Medication concentration used: 0.2% Ropivacaine with 2 mcg/mL Fentanyl  Dose (mL): 5  Lockout (minutes): 20  1-Hour Limit (boluses/hr): 25  Basal Rate: 10        Assessment  Block outcome: pain improved  Number of attempts: 1  Events: no positive test dose  Procedure assessment: patient tolerated procedure well with no immediate  complications

## 2024-12-22 NOTE — ANESTHESIA PREPROCEDURE EVALUATION
"Patient: Faustino Golden \"Aishwarya\"    Evaluation Method: Chart review    Procedure Information    Date: 12/21/24  Procedure: Labor Consult         Relevant Problems   Anesthesia (within normal limits)      Cardiac (within normal limits)      Pulmonary (within normal limits)      Neuro (within normal limits)      GI (within normal limits)      /Renal (within normal limits)      Liver (within normal limits)      Endocrine (within normal limits)      Hematology (within normal limits)      Musculoskeletal (within normal limits)      HEENT (within normal limits)      ID (within normal limits)      Skin (within normal limits)      GYN   (+) 40 weeks gestation of pregnancy ([facility])   (+) Encounter for supervision of normal first pregnancy in first trimester       Clinical information reviewed:   Tobacco  Allergies  Meds  Problems  Med Hx  Surg Hx   Fam Hx          NPO Detail:  No data recorded     OB/Gyn Evaluation    Present Pregnancy    Patient is pregnant now.   Obstetric History                Physical Exam    Airway  Mallampati: I  TM distance: >3 FB  Neck ROM: full     Cardiovascular - normal exam     Dental - normal exam     Pulmonary - normal exam     Abdominal - normal exam             Anesthesia Plan    History of general anesthesia?: yes  History of complications of general anesthesia?: no    ASA 2     epidural     The patient is not a current smoker.    Anesthetic plan and risks discussed with patient.  Use of blood products discussed with patient who consented to blood products.        "

## 2024-12-22 NOTE — ANESTHESIA POSTPROCEDURE EVALUATION
"Patient: Faustino Golden \"Aishwarya\"    Procedure Summary       Date: 12/21/24 Room / Location:     Anesthesia Start: 2335 Anesthesia Stop: 12/22/24 1009    Procedure: Labor Analgesia Diagnosis:     Scheduled Providers:  Responsible Provider: HUGO Monk    Anesthesia Type: epidural ASA Status: 2            Anesthesia Type: epidural    Vitals Value Taken Time   /58 12/22/24 1639   Temp 36.5 12/22/24 1639   Pulse 74 12/22/24 1639   Resp 16 12/22/24 1639   SpO2 99 12/22/24 1639       Anesthesia Post Evaluation    Patient location during evaluation: bedside  Patient participation: complete - patient participated  Level of consciousness: awake and alert  Pain score: 0  Pain management: adequate  Airway patency: patent  Cardiovascular status: acceptable, blood pressure returned to baseline and hemodynamically stable  Respiratory status: acceptable, nonlabored ventilation and spontaneous ventilation  Hydration status: acceptable  Postoperative Nausea and Vomiting: none        No notable events documented.    "

## 2024-12-22 NOTE — SIGNIFICANT EVENT
S: Aishwarya reports feeling some cramps, agreeable to cervical exam and CRB if appropriate.   O: , moderate, +accels, -decels       [place]: q 1-3 mins       CE: 1.5/80/-2, posterior   A: IUP at 40.5 wks      Cat I FHR      IOL--> cervical ripening phase  P: Discussed unable to give additional cytotec at this time. Discussed option for CRB and Aishwarya agreed. CRB placed digitally. During inflation, SROM noted, fluid clear. CRB fully inflated and  placement confirmed.       Updated Dr. Hernandez on CRB placement and SROM during inflation. Will leave CRB in place at this time.        cEFM.        Reassess in 2-4 hrs or sooner prn.        Will consider pitocin once CRB out.        Epidural per pt request.        Anticipate .     NATALEE Hanson-MARIELLE

## 2024-12-22 NOTE — PROGRESS NOTES
I remained at bedside with pt through pushing efforts.  Pt now pushing approximately x3 hrs.  Manually rotated baby from RIZWAN to BOLA with noticeable progress in fetal descent since repositioning.  0930 Dr. Crawford to bedside to evaluate due to cat II tracing, prolonged 2nd stage, and to assess for possible need for vacuum assist due to maternal exhaustion.  Pt remains with great effort and improved progress in past 20-30 minutes since fetal repositioning.  Will continue to monitor FHR and assess progress, overall FHR reassuring with moderate variability between ctxs, intermittent variable decels with ctxs.  Continue pitocin per protocol.  Dr. Crawford aware and agrees with above plan.    Miesha Zamarripa, NATALEE-MARIELLE

## 2024-12-22 NOTE — SIGNIFICANT EVENT
S: Aishwarya reports feeling comfortable with epidural infusing, consents to cervical exam.   O: , moderate, +accels, -decels       [place]: q 1-3 mins       CE: crb out, /-1  A: IUP at 40.6 wks      Cat I FHR      IOL--> early labor  P: Discussed option for pitocin vs. Expectant management at this time in the context of frequent contractions and good cervical change. Aishwarya would like expectant management for the next 2 hours and will re-evaluate for pitocin at that time based on contraction frequency and/or cervical exam.        cEFM.        Encouraged rest when able.        Reassess in 1-2 hrs or sooner prn.        Anticipate .     CHRISTELLE Easley, NATALEE-EMETERIOM

## 2024-12-23 ENCOUNTER — PHARMACY VISIT (OUTPATIENT)
Dept: PHARMACY | Facility: CLINIC | Age: 29
End: 2024-12-23
Payer: COMMERCIAL

## 2024-12-23 ENCOUNTER — APPOINTMENT (OUTPATIENT)
Dept: OBSTETRICS AND GYNECOLOGY | Facility: CLINIC | Age: 29
End: 2024-12-23
Payer: COMMERCIAL

## 2024-12-23 VITALS
SYSTOLIC BLOOD PRESSURE: 140 MMHG | OXYGEN SATURATION: 98 % | DIASTOLIC BLOOD PRESSURE: 82 MMHG | RESPIRATION RATE: 16 BRPM | HEART RATE: 94 BPM | TEMPERATURE: 97.1 F

## 2024-12-23 PROBLEM — Z34.90 ENCOUNTER FOR INDUCTION OF LABOR: Status: RESOLVED | Noted: 2024-12-21 | Resolved: 2024-12-23

## 2024-12-23 PROCEDURE — RXMED WILLOW AMBULATORY MEDICATION CHARGE

## 2024-12-23 PROCEDURE — 2500000001 HC RX 250 WO HCPCS SELF ADMINISTERED DRUGS (ALT 637 FOR MEDICARE OP): Performed by: ADVANCED PRACTICE MIDWIFE

## 2024-12-23 RX ORDER — ACETAMINOPHEN 325 MG/1
975 TABLET ORAL EVERY 6 HOURS
Qty: 360 TABLET | Refills: 0 | Status: SHIPPED | OUTPATIENT
Start: 2024-12-23 | End: 2025-01-22

## 2024-12-23 RX ORDER — IBUPROFEN 600 MG/1
600 TABLET ORAL EVERY 6 HOURS
Qty: 120 TABLET | Refills: 0 | Status: SHIPPED | OUTPATIENT
Start: 2024-12-23 | End: 2025-01-22

## 2024-12-23 RX ADMIN — ACETAMINOPHEN 975 MG: 325 TABLET, FILM COATED ORAL at 07:20

## 2024-12-23 RX ADMIN — ACETAMINOPHEN 975 MG: 325 TABLET, FILM COATED ORAL at 01:36

## 2024-12-23 RX ADMIN — IBUPROFEN 600 MG: 600 TABLET ORAL at 07:20

## 2024-12-23 RX ADMIN — ACETAMINOPHEN 975 MG: 325 TABLET, FILM COATED ORAL at 13:48

## 2024-12-23 RX ADMIN — IBUPROFEN 600 MG: 600 TABLET ORAL at 13:48

## 2024-12-23 RX ADMIN — IBUPROFEN 600 MG: 600 TABLET ORAL at 01:37

## 2024-12-23 ASSESSMENT — PAIN SCALES - GENERAL
PAINLEVEL_OUTOF10: 2
PAINLEVEL_OUTOF10: 2
PAINLEVEL_OUTOF10: 3

## 2024-12-23 ASSESSMENT — PAIN DESCRIPTION - LOCATION: LOCATION: PERINEUM

## 2024-12-23 NOTE — DISCHARGE SUMMARY
Discharge Summary    Admission Date: 12/21/2024  Discharge Date: 12/23/2024    Discharge Diagnosis  Encounter for induction of labor    Hospital Course  Delivery Date: 12/22/2024 10:09 AM  Delivery type: Vaginal, Spontaneous   GA at delivery: 40w6d  Outcome: Living  Anesthesia during delivery: Epidural  Intrapartum complications: None  Feeding method: Breastfeeding Status: Yes     Procedures: none  Contraception at discharge: IUD    Pt feeling and doing well. She notices some cramping after breastfeeding and is managing this with medication, we discussed utilizing heat packs as well. She is ambulating without difficulty and voiding spontaneously, she has not yet had a BM. We discussed constipation prevention and she is already taking PP colace and has some at home. Bleeding is light. She is breastfeeding with minimal assistance, she is having some nipple soreness but is working with lactation and using lanolin. She endorses effective coping and denies concerns for depression at this time. She is interested in discharge today if possible, TBD by pediatrician. Their questions were answered to their satisfaction and they verbalized understanding to all information. Anticipate early discharge later today if pediatrician agreeable, otherwise anticipate routine discharge tomorrow.     Pertinent Physical Exam At Time of Discharge  General: Examination reveals a well developed, well nourished, female, in no acute distress. She is alert and cooperative.  HEENT: PERRLA. External ears normal. Nose normal, no erythema or discharge. Mouth and throat clear.  Neck: supple, no significant adenopathy.  Lungs: clear to auscultation bilaterally.  Cardiac: regular rate and rhythm, S1, S2 normal, no murmur, click, rub or gallop.  Breasts: lactating, no erythema or tenderness, nipples normal.  Abdomen: soft, non-tender, no palpable masses, BS+.  Fundus: firm and below umbilicus.  Perineum: deferred.  Extremities: no redness or tenderness  in the calves or thighs, no edema.  Neurological: alert, oriented, normal speech, no focal findings or movement disorder noted.  Psychological: awake and alert; oriented to person, place, and time.    Last Vitals:  Temp Pulse Resp BP MAP Pulse Ox   36.3 °C (97.4 °F) 72 18 110/65 73 97 %     Discharge Meds     Your medication list        START taking these medications        Instructions Last Dose Given Next Dose Due   acetaminophen 325 mg tablet  Commonly known as: Tylenol      Take 3 tablets (975 mg) by mouth every 6 hours.       ibuprofen 600 mg tablet      Take 1 tablet (600 mg) by mouth every 6 hours.              CONTINUE taking these medications        Instructions Last Dose Given Next Dose Due   PRENATAL 2-IRON-FOLIC ACID-OM3 ORAL                     Where to Get Your Medications        These medications were sent to [facility]  [address] , [address], [place] [address]      Hours: 8 AM to 6 PM Mon-Fri, 9 AM to 1 PM Saturday Phone: [phone]   acetaminophen 325 mg tablet  ibuprofen 600 mg tablet          Complications Requiring Follow-Up  Routine postpartum care - pt will call and arrange     Test Results Pending At Discharge  Pending Labs       No current pending labs.            Outpatient Follow-Up  No future appointments.    I spent 10 minutes in the professional and overall care of this patient.      NATALEE Cardenas-MARIELLE

## 2024-12-23 NOTE — CARE PLAN
The patient's goals for the shift include infant bonding, breastfeeding    The clinical goals for the shift include pain management      Problem: Postpartum  Goal: Experiences normal postpartum course  Outcome: Progressing  Goal: Appropriate maternal -  bonding  Outcome: Progressing  Goal: Establish and maintain infant feeding pattern for adequate nutrition  Outcome: Progressing  Goal: Incisions, wounds, or drain sites healing without S/S of infection  Outcome: Progressing  Goal: No s/sx infection  Outcome: Progressing  Goal: No s/sx of hemorrhage  Outcome: Progressing  Goal: Minimal s/sx of HDP and BP<160/110  Outcome: Progressing

## 2024-12-23 NOTE — CARE PLAN
The patient's goals for the shift include bonding with     The clinical goals for the shift include vitals and assessments WNL    Over the shift, the patient met the above goals. Discharge education reviewed with the patient and her significant other; both verbalize understanding of the discharge instructions. The patient is ready for discharge.

## 2024-12-23 NOTE — LACTATION NOTE
Lactation Consultant Note  Lactation Consultation  Reason for Consult: Initial assessment  Consultant Name: BRANDEN Penn    Maternal Information  Has mother  before?: No  Infant to breast within first 2 hours of birth?: Yes  Exclusive Pump and Bottle Feed: No    Maternal Assessment  Breast Assessment: Medium, Soft, Compressible  Nipple Assessment: Intact, Erect, Inverted centrally  Alterations in Nipple Condition: Stage I - pain or irritation with no skin break down  [place] Assessment: Normal    Infant Assessment  Infant Behavior: Readiness to feed, Feeding cues observed, Rooting response    Feeding Assessment  Nutrition Source: Breastmilk  Feeding Method: Nursing at the breast  Feeding Position: Football/seated, C - hold, Mother needs assistance with latch/positioning, Chin tucked into breast  Suck/Feeding: Sustained, Audible swallowing  Latch Assessment: Minimal assistance is needed, Instructed on deep latch, Eagerly grasped on to latch, Deep latch obtained    LATCH TOOL  Latch: Grasps breast, tongue down, lips flanged, rhythmic sucking  Audible Swallowing: A few with stimulation  Type of Nipple: Everted (After stimulation)  Comfort (Breast/Nipple): Filling, red/small blisters/bruises, mild/moderate discomfort  Hold (Positioning): Minimal assist, teach one side, mother does other, staff holds  LATCH Score: 7    Breast Pump       Other OB Lactation Tools  Lactation Tools: Lanolin, Comfort gels    Patient Follow-up  Inpatient Lactation Follow-up Needed : Yes    Other OB Lactation Documentation       Recommendations/Summary  In for visit with mom. Infant showing hunger cues. Infant eager to latch onto the breast. Mom states more discomfort with latch despite deep latch obtained. Possible discomfort from centrally inverted nipples. Infant with strong suck. No bruising, cracks or bleeding noted to the nipples/breasts. Assisted mom with positioning of infant in football hold. Discussed tips and tricks for  positioning infant correctly. Discussed c-hold for assistance. Reviewed feeding frequency, expected output for infant in the first few days of life and normal  behaviors. Discussed pumping at home. Questions answered. Encouraged mom to call for assistance with next latch or any questions.

## 2024-12-31 ENCOUNTER — LACTATION ENCOUNTER (OUTPATIENT)
Dept: PEDIATRICS | Facility: HOSPITAL | Age: 29
End: 2024-12-31

## 2024-12-31 NOTE — LACTATION NOTE
This note was copied from a baby's chart.  Lactation Consultant Note  Lactation Consultation  Reason for Consult: Initial assessment  Consultant Name: Alana Frank    Maternal Information  Has mother  before?: No    Maternal Assessment  Breast Assessment: Small  Nipple Assessment: Intact  Areola Assessment: Normal    Infant Assessment  Infant Behavior: Awake  Infant Assessment: Jaundice    Feeding Assessment  Nutrition Source: Breastmilk  Feeding Method: Nursing at the breast  Feeding Position: Cross - cradle  Suck/Feeding: Sustained, Coordinated suck/swallow/breathe, Audible swallowing, Content after feeding  Latch Assessment: Moderate assistance is needed, Instructed on deep latch, Eagerly grasped on to latch, Deep latch obtained, Latch achieved after repeated attempts, Comfortable with no pain, Sucking and swallowing, Frequent audible swallows    LATCH TOOL  Latch: Grasps breast, tongue down, lips flanged, rhythmic sucking  Audible Swallowing: Spontaneous and intermittent (24 hours old)  Type of Nipple: Everted (After stimulation)  Comfort (Breast/Nipple): Soft/non-tender  Hold (Positioning): Minimal assist, teach one side, mother does other, staff holds  LATCH Score: 9    Breast Pump       Other OB Lactation Tools       Patient Follow-up  Outpatient Lactation Follow-up: Recommended    Other OB Lactation Documentation       Recommendations/Summary  Reviewed Breastfeeding discharge information: Electric Breast Pumps & Milk Storage for Your Healthy Baby, Breast-feeding: Tips to Increase Your Milk Supply, Is My Breast-fed Baby Getting Enough to Eat?, Nursing Your Baby,  Lactation Center Information, [facility] Breastfeeding & safe sleep information, [facility] Breastfeeding Hotline.  Your baby should nurse a minimum of 8-12 times in 24 hours (every 2-3 hours). Wake a sleepy baby every 3 hours to feed, even during the night. The more often you nurse, the more milk your body will produce. Burp your baby when switching  sides and at the end of a feeding. Expect at least 1 wet diaper per day for the first 2 days, increasing to 6-8 diapers per day by day 7 of age.  Encouraged mom to contact outpatient lactation with any further questions or concerns after discharge.

## 2025-02-10 ENCOUNTER — POSTPARTUM VISIT (OUTPATIENT)
Dept: OBSTETRICS AND GYNECOLOGY | Facility: CLINIC | Age: 30
End: 2025-02-10
Payer: COMMERCIAL

## 2025-02-10 VITALS
WEIGHT: 113 LBS | DIASTOLIC BLOOD PRESSURE: 76 MMHG | BODY MASS INDEX: 20.02 KG/M2 | SYSTOLIC BLOOD PRESSURE: 111 MMHG | HEIGHT: 63 IN | HEART RATE: 91 BPM

## 2025-02-10 DIAGNOSIS — M62.08 DIASTASIS RECTI: Primary | ICD-10-CM

## 2025-02-10 ASSESSMENT — EDINBURGH POSTNATAL DEPRESSION SCALE (EPDS)
I HAVE BEEN ANXIOUS OR WORRIED FOR NO GOOD REASON: HARDLY EVER
I HAVE FELT SCARED OR PANICKY FOR NO GOOD REASON: NO, NOT MUCH
I HAVE BEEN SO UNHAPPY THAT I HAVE BEEN CRYING: NO, NEVER
THINGS HAVE BEEN GETTING ON TOP OF ME: NO, I HAVE BEEN COPING AS WELL AS EVER
THE THOUGHT OF HARMING MYSELF HAS OCCURRED TO ME: NEVER
I HAVE LOOKED FORWARD WITH ENJOYMENT TO THINGS: AS MUCH AS I EVER DID
TOTAL SCORE: 3
I HAVE FELT SAD OR MISERABLE: NO, NOT AT ALL
I HAVE BEEN ABLE TO LAUGH AND SEE THE FUNNY SIDE OF THINGS: AS MUCH AS I ALWAYS COULD
I HAVE BLAMED MYSELF UNNECESSARILY WHEN THINGS WENT WRONG: NOT VERY OFTEN
I HAVE BEEN SO UNHAPPY THAT I HAVE HAD DIFFICULTY SLEEPING: NOT AT ALL

## 2025-02-10 ASSESSMENT — PAIN SCALES - GENERAL: PAINLEVEL_OUTOF10: 0-NO PAIN

## 2025-02-10 NOTE — PROGRESS NOTES
"Assessment   IMPRESSIONS:  Thank you for coming to your postpartum visit. Everything seems to be recovering appropriately at this time. You are free to resume normal activity. Please don't hesitate to call us for breast complaints, abnormal bleeding, mood changes, or other concerning symptoms - we have many good treatment options for these issues.     Faustino \"Aishwarya\" was seen today for postpartum follow-up.  Diagnoses and all orders for this visit:  Diastasis recti (Primary)  -     Referral to Physical Therapy; Future  Encounter for postpartum visit      We look forward to seeing you again at your next scheduled visit in 6-12 months.    Jose Cruz Easley, NATALEE-MARIELLE    Subjective   29 y.o.  presenting for postpartum follow-up     Delivery Date: 2024  GA at Delivery: 40.6 wks  Type of Delivery:      Pregnancy notable for: uncomplicated    Concerns: none    Pain: controlled  Lacerations: 2nd degree with repair, bilateral labial lac with repair   Lochia: still some light brown/pink   Menses: none yet  Sexual Intimacy: not yet  Contraceptive Method: ParaGard placed immediately post-placenta   Feeding Method: exclusive breastfeeding, going well after working with IBCLC   Lactation Consult Needed?:     Birth Trauma: No  Bonding with Baby: well with baby Shukri  Mood: stable, no concern for PPD/PPA    Objective   /76   Pulse 91   Ht 1.6 m (5' 3\")   Wt 51.3 kg (113 lb)   LMP 2024   Breastfeeding Yes   BMI 20.02 kg/m²      General:   Alert and oriented, in no acute distress   Skin: No significant acne. No hirsutism.   Neck: Supple. No visible thyromegaly.        Abdomen: Soft, non-tender, without masses or organomegaly, diastasis recti    Vulva: Normal architecture without erythema, masses, or lesions, laceration healing well.    Vagina: Normal mucosa without lesions, masses, or atrophy. Scant dark brown discharge    Cervix: Normal without masses, lesions, or signs of cervicitis, IUD " strings palpated at external os   Uterus: Normal mobile, non-enlarged uterus    Adnexa: Normal without masses or lesions   Pelvic Floor No POP noted. No high tone pelvic floor    Psych Normal affect. Normal mood.

## 2025-02-21 DIAGNOSIS — N61.0 MASTITIS: Primary | ICD-10-CM

## 2025-02-21 RX ORDER — DICLOXACILLIN SODIUM 500 MG/1
500 CAPSULE ORAL 4 TIMES DAILY
Qty: 40 CAPSULE | Refills: 0 | Status: SHIPPED | OUTPATIENT
Start: 2025-02-21 | End: 2025-03-03

## 2025-03-11 ENCOUNTER — APPOINTMENT (OUTPATIENT)
Dept: PHYSICAL THERAPY | Facility: CLINIC | Age: 30
End: 2025-03-11
Payer: COMMERCIAL

## 2025-06-09 ENCOUNTER — APPOINTMENT (OUTPATIENT)
Dept: OBSTETRICS AND GYNECOLOGY | Facility: CLINIC | Age: 30
End: 2025-06-09
Payer: COMMERCIAL

## 2025-06-09 VITALS — BODY MASS INDEX: 18.78 KG/M2 | SYSTOLIC BLOOD PRESSURE: 108 MMHG | DIASTOLIC BLOOD PRESSURE: 71 MMHG | WEIGHT: 106 LBS

## 2025-06-09 DIAGNOSIS — Z30.432 ENCOUNTER FOR IUD REMOVAL: Primary | ICD-10-CM

## 2025-06-09 PROCEDURE — 58301 REMOVE INTRAUTERINE DEVICE: CPT | Performed by: ADVANCED PRACTICE MIDWIFE

## 2025-06-09 ASSESSMENT — ENCOUNTER SYMPTOMS
RESPIRATORY NEGATIVE: 0
CONSTITUTIONAL NEGATIVE: 0
CARDIOVASCULAR NEGATIVE: 0
MUSCULOSKELETAL NEGATIVE: 0
ENDOCRINE NEGATIVE: 0
PSYCHIATRIC NEGATIVE: 0
NEUROLOGICAL NEGATIVE: 0
HEMATOLOGIC/LYMPHATIC NEGATIVE: 0
GASTROINTESTINAL NEGATIVE: 0
ALLERGIC/IMMUNOLOGIC NEGATIVE: 0
EYES NEGATIVE: 0

## 2025-06-09 NOTE — PROGRESS NOTES
"Aishwaray presents today for IUD removal.   Has had intermittent spotting and brown discharge that she feels is related to IUD and would like to have it removed today. Is aware of rapid return of fertility and would be accepting of pregnancy if it were to happen, plans to use condoms for contraception at this time.   Patient ID: Faustino Golden \"Katerina" is a 29 y.o. female.    IUD Management    Performed by: DEEP Moreno  Authorized by: DEEP Moreno    Procedure: IUD removal    Consent obtained by patient, parent, or legal power of  - including discussion of procedure risks and benefits, patient questions answered, and patient education provided: yes    Reason for removal: patient request    Strings visualized: yes    Tenaculum applied to cervix: no    Cervix manually dilated: no    IUD grasped by forceps: yes    Performed with ultrasound guidance: no    IUD removed: yes    Date/Time of Removal:  6/9/2025 2:17 PM  Removed without complications: yes    IUD intact: yes      "

## 2025-07-24 DIAGNOSIS — N61.0 MASTITIS: Primary | ICD-10-CM

## 2025-07-24 RX ORDER — DICLOXACILLIN SODIUM 500 MG/1
500 CAPSULE ORAL 4 TIMES DAILY
Qty: 40 CAPSULE | Refills: 0 | Status: SHIPPED | OUTPATIENT
Start: 2025-07-24 | End: 2025-08-03